# Patient Record
Sex: FEMALE | Race: WHITE | NOT HISPANIC OR LATINO | Employment: FULL TIME | URBAN - METROPOLITAN AREA
[De-identification: names, ages, dates, MRNs, and addresses within clinical notes are randomized per-mention and may not be internally consistent; named-entity substitution may affect disease eponyms.]

---

## 2017-03-23 ENCOUNTER — OFFICE VISIT (OUTPATIENT)
Dept: OBSTETRICS AND GYNECOLOGY | Facility: CLINIC | Age: 32
End: 2017-03-23
Payer: COMMERCIAL

## 2017-03-23 VITALS
DIASTOLIC BLOOD PRESSURE: 80 MMHG | HEIGHT: 66 IN | WEIGHT: 165.81 LBS | SYSTOLIC BLOOD PRESSURE: 112 MMHG | BODY MASS INDEX: 26.65 KG/M2

## 2017-03-23 DIAGNOSIS — Z87.42 HISTORY OF ABNORMAL CERVICAL PAP SMEAR: ICD-10-CM

## 2017-03-23 DIAGNOSIS — R11.0 NAUSEA: ICD-10-CM

## 2017-03-23 DIAGNOSIS — G43.109 MIGRAINE WITH AURA AND WITHOUT STATUS MIGRAINOSUS, NOT INTRACTABLE: ICD-10-CM

## 2017-03-23 DIAGNOSIS — Z30.09 GENERAL COUNSELING AND ADVICE FOR CONTRACEPTIVE MANAGEMENT: Primary | ICD-10-CM

## 2017-03-23 PROCEDURE — 99999 PR PBB SHADOW E&M-NEW PATIENT-LVL III: CPT | Mod: PBBFAC,,, | Performed by: OBSTETRICS & GYNECOLOGY

## 2017-03-23 PROCEDURE — 88142 CYTOPATH C/V THIN LAYER: CPT

## 2017-03-23 PROCEDURE — 99395 PREV VISIT EST AGE 18-39: CPT | Mod: S$GLB,,, | Performed by: OBSTETRICS & GYNECOLOGY

## 2017-03-23 PROCEDURE — 87624 HPV HI-RISK TYP POOLED RSLT: CPT

## 2017-03-23 RX ORDER — CALC/MAG/B COMPLEX/D3/HERB 61
15 TABLET ORAL DAILY
Status: ON HOLD | COMMUNITY
End: 2019-10-23

## 2017-03-23 RX ORDER — LORAZEPAM 0.5 MG/1
0.5 TABLET ORAL EVERY 6 HOURS PRN
Status: ON HOLD | COMMUNITY
End: 2023-03-12 | Stop reason: HOSPADM

## 2017-03-23 RX ORDER — ONDANSETRON 4 MG/1
4 TABLET, ORALLY DISINTEGRATING ORAL EVERY 12 HOURS PRN
Qty: 30 TABLET | Refills: 0 | Status: SHIPPED | OUTPATIENT
Start: 2017-03-23 | End: 2018-12-04 | Stop reason: SDUPTHER

## 2017-03-23 RX ORDER — BUTALBITAL, ACETAMINOPHEN AND CAFFEINE 50; 325; 40 MG/1; MG/1; MG/1
1 TABLET ORAL EVERY 4 HOURS PRN
Qty: 30 TABLET | Refills: 1 | Status: SHIPPED | OUTPATIENT
Start: 2017-03-23 | End: 2017-04-22

## 2017-03-23 RX ORDER — ACETAMINOPHEN AND CODEINE PHOSPHATE 120; 12 MG/5ML; MG/5ML
1 SOLUTION ORAL DAILY
Qty: 30 TABLET | Refills: 11 | Status: SHIPPED | OUTPATIENT
Start: 2017-03-23 | End: 2018-07-11 | Stop reason: ALTCHOICE

## 2017-03-23 NOTE — PROGRESS NOTES
"CC: Well woman exam    Rosy Lei is a 32 y.o. female  presents for well woman exam.  LMP: Patient's last menstrual period was 2017..  The patient reports that in 2016 [facility], this was followed by a colposcopy that demonstrated LIANNA II.  She then followed with a physician (Dr. Lopez), in [place] who has been following her with q 3 months pap as did not want to proceed with a LEEP.  . Interested in birth control options, does report migraine with visual symptoms, would like change in pills as feels current contraception has caused weight gain.  Patient has recently lost her job and will be losing insurance and wants to get follow-up prior to that.  Also reports a family history of breast cancer in her maternal aunt at age 28, has not been offered or considered Gene testing.      Past Medical History:   Diagnosis Date    Abnormal Pap smear of cervix     ADHD (attention deficit hyperactivity disorder)     Insomnia     Peptic ulcer disease      Past Surgical History:   Procedure Laterality Date    BREAST SURGERY      CERVICAL BIOPSY  W/ LOOP ELECTRODE EXCISION       Social History     Social History    Marital status: Single     Spouse name: N/A    Number of children: N/A    Years of education: N/A     Occupational History    Not on file.     Social History Main Topics    Smoking status: Former Smoker     Packs/day: 1.50     Years: 13.00     Types: Cigarettes     Quit date: 2012    Smokeless tobacco: Not on file    Alcohol use No    Drug use: No    Sexual activity: Not Currently     Other Topics Concern    Not on file     Social History Narrative     Family History   Problem Relation Age of Onset    Fibroids Mother     Crohn's disease Sister     Fibroids Sister     Breast cancer Maternal Aunt      OB History      Para Term  AB TAB SAB Ectopic Multiple Living    1    1               /80  Ht 5' 6" (1.676 m)  Wt 75.2 " kg (165 lb 12.6 oz)  LMP 03/18/2017  BMI 26.76 kg/m2      ROS:  GENERAL: Denies weight gain or weight loss. Feeling well overall.   SKIN: Denies rash or lesions.   HEAD: Denies head injury or headache.   NODES: Denies enlarged lymph nodes.   CHEST: Denies chest pain or shortness of breath.   CARDIOVASCULAR: Denies palpitations or left sided chest pain.   ABDOMEN: No abdominal pain, constipation, diarrhea, nausea, vomiting or rectal bleeding.   URINARY: No frequency, dysuria, hematuria, or burning on urination.  REPRODUCTIVE: See HPI.   BREASTS: The patient performs breast self-examination and denies pain, lumps, or nipple discharge.   HEMATOLOGIC: No easy bruisability or excessive bleeding.   MUSCULOSKELETAL: Denies joint pain or swelling.   NEUROLOGIC: Denies syncope or weakness.   PSYCHIATRIC: Denies depression, anxiety or mood swings.    PHYSICAL EXAM:  APPEARANCE: Well nourished, well developed, in no acute distress.  AFFECT: WNL, alert and oriented x 3  SKIN: No acne or hirsutism  NECK: Neck symmetric without masses or thyromegaly  NODES: No inguinal, cervical, axillary, or femoral lymph node enlargement  CHEST: Good respiratory effect  ABDOMEN: Soft.  No tenderness or masses.  No hepatosplenomegaly.  No hernias.  BREASTS: Symmetrical, no skin changes or visible lesions.  No palpable masses, nipple discharge bilaterally.  PELVIC: Normal external genitalia without lesions.  Normal hair distribution.  Adequate perineal body, normal urethral meatus.  Vagina moist and well rugated without lesions or discharge.  Cervix pink, without lesions, discharge or tenderness.  No significant cystocele or rectocele.  Bimanual exam shows uterus to be normal size, regular, mobile and nontender.  Adnexa without masses or tenderness.    EXTREMITIES: No edema.    General counseling and advice for contraceptive management  -     norethindrone (MICRONOR) 0.35 mg tablet; Take 1 tablet (0.35 mg total) by mouth once daily.  Dispense:  30 tablet; Refill: 11    History of abnormal cervical Pap smear- LIANNA II  -     Cancel: Liquid-based pap smear, screening  -     HPV DNA probe, amplified  -     Liquid-based pap smear, screening    Nausea  -     ondansetron (ZOFRAN-ODT) 4 MG TbDL; Take 1 tablet (4 mg total) by mouth every 12 (twelve) hours as needed.  Dispense: 30 tablet; Refill: 0    Migraine with aura and without status migrainosus, not intractable  -     norethindrone (MICRONOR) 0.35 mg tablet; Take 1 tablet (0.35 mg total) by mouth once daily.  Dispense: 30 tablet; Refill: 11  - Instructed to take at same time everyday, also instructed to use back-up contraception.    Other orders  -     butalbital-acetaminophen-caffeine -40 mg (FIORICET, ESGIC) -40 mg per tablet; Take 1 tablet by mouth every 4 (four) hours as needed for Pain.  Dispense: 30 tablet; Refill: 1  - Instructed to use sparingly due to risk of rebound HA with frequent use             Patient was counseled today on A.C.S. Pap guidelines and recommendations for yearly pelvic exams, mammograms and monthly self breast exams; to see her PCP for other health maintenance.     No Follow-up on file.

## 2017-03-23 NOTE — MR AVS SNAPSHOT
[facility] 5th [facility]  [address]  [facility] [address]  Phone: [phone]                  Rosy Lie   3/23/2017 9:30 AM   Office Visit    Description:  Female : 1985   Provider:  Anna Spangler DO   Department:  [facility] 5th Floor           Reason for Visit     Well Woman                To Do List           Goals (5 Years of Data)     None      [facility]     [facility] On Call Nurse Care Line -  Assistance  Registered nurses in the [facility] On Call Center provide clinical advisement, health education, appointment booking, and other advisory services.  Call for this free service at [phone].             Medications           Message regarding Medications     Verify the changes and/or additions to your medication regime listed below are the same as discussed with your clinician today.  If any of these changes or additions are incorrect, please notify your healthcare provider.        STOP taking these medications     cyclobenzaprine (FLEXERIL) 5 MG tablet Take 1 tablet (5 mg total) by mouth 3 (three) times daily as needed for Muscle spasms.    esomeprazole (NEXIUM) 40 MG capsule Take 40 mg by mouth before breakfast.    norgestrel-ethinyl estradiol (LO/OVRAL) 0.3-30 mg-mcg per tablet Take 1 tablet by mouth once daily.    ondansetron (ZOFRAN-ODT) 4 MG TbDL Take 1 tablet (4 mg total) by mouth every 12 (twelve) hours as needed.           Verify that the below list of medications is an accurate representation of the medications you are currently taking.  If none reported, the list may be blank. If incorrect, please contact your healthcare provider. Carry this list with you in case of emergency.           Current Medications     dextroamphetamine-amphetamine (AMPHETAMINE SALT COMBO) 10 mg Tab Take 10 mg by mouth 3 (three) times daily.    lansoprazole (PREVACID) 15 MG capsule Take 15 mg by mouth once daily.    lorazepam (ATIVAN) 0.5 MG tablet Take 0.5 mg by mouth  "every 6 (six) hours as needed for Anxiety.    zolpidem (AMBIEN) 5 MG Tab Take 1 tablet (5 mg total) by mouth nightly as needed.           Clinical Reference Information           Your Vitals Were     BP Height Weight Last Period BMI    112/80 5' 6" (1.676 m) 75.2 kg (165 lb 12.6 oz) 03/18/2017 26.76 kg/m2      Blood Pressure          Most Recent Value    BP  112/80      Allergies as of 3/23/2017     Lortab [Hydrocodone-acetaminophen]      Immunizations Administered on Date of Encounter - 3/23/2017     None      [facility] Sign-Up     Activating your [facility] account is as easy as 1-2-3!     1) Visit [email or web address], select Sign Up Now, enter this activation code and your date of birth, then select Next.  [email or web address]  Expires: 5/7/2017 10:03 AM      2) Create a username and password to use when you visit [facility] in the future and select a security question in case you lose your password and select Next.    3) Enter your e-mail address and click Sign Up!    Additional Information  If you have questions, please e-mail [email or web address] or call [phone] to talk to our [facility] staff. Remember, [facility] is NOT to be used for urgent needs. For medical emergencies, dial 911.         Language Assistance Services     ATTENTION: Language assistance services are available, free of charge. Please call [phone].      ATENCIÓN: Si habla español, tiene a stevens disposición servicios gratuitos de asistencia lingüística. Llame al [phone].     CHÚ Ý: N?u b?n nói Ti?ng Vi?t, có các d?ch v? h? tr? ngôn ng? mi?n phí dành cho b?n. G?i s? [phone].         [facility] complies with applicable Federal civil rights laws and does not discriminate on the basis of race, color, national origin, age, disability, or sex.        "

## 2017-03-29 ENCOUNTER — TELEPHONE (OUTPATIENT)
Dept: OBSTETRICS AND GYNECOLOGY | Facility: CLINIC | Age: 32
End: 2017-03-29

## 2017-03-29 NOTE — TELEPHONE ENCOUNTER
----- Message from Abril Medina sent at 3/29/2017 11:22 AM CDT -----  Contact: Patient  X _1st Request  _  2nd Request  _  3rd Request    Who:KRYS ANGULO [[id number]]    Why:Patient was calling to see if her lab results were completed     What Number to Call Back:Patient can be reached at [phone]    When to Expect a call back: (Before the end of the day)   -- if call after 3:00 call back will be tomorrow.

## 2017-03-30 LAB
HPV16 DNA SPEC QL NAA+PROBE: NEGATIVE
HPV16+18+H RISK 12 DNA CVX-IMP: NEGATIVE
HPV18 DNA SPEC QL NAA+PROBE: NEGATIVE

## 2018-06-21 ENCOUNTER — APPOINTMENT (RX ONLY)
Dept: URBAN - METROPOLITAN AREA CLINIC 98 | Facility: CLINIC | Age: 33
Setting detail: DERMATOLOGY
End: 2018-06-21

## 2018-06-21 DIAGNOSIS — L30.9 DERMATITIS, UNSPECIFIED: ICD-10-CM | Status: INADEQUATELY CONTROLLED

## 2018-06-21 PROBLEM — M12.9 ARTHROPATHY, UNSPECIFIED: Status: ACTIVE | Noted: 2018-06-21

## 2018-06-21 PROBLEM — F41.9 ANXIETY DISORDER, UNSPECIFIED: Status: ACTIVE | Noted: 2018-06-21

## 2018-06-21 PROCEDURE — ? PRESCRIPTION

## 2018-06-21 PROCEDURE — ? TREATMENT REGIMEN

## 2018-06-21 PROCEDURE — ? MEDICATION COUNSELING

## 2018-06-21 PROCEDURE — 99203 OFFICE O/P NEW LOW 30 MIN: CPT

## 2018-06-21 RX ORDER — TERBINAFINE HCL 250 MG
TABLET ORAL
Qty: 14 | Refills: 0 | Status: ERX | COMMUNITY
Start: 2018-06-21

## 2018-06-21 RX ORDER — MINOCYCLINE HYDROCHLORIDE 100 MG/1
CAPSULE ORAL
Qty: 28 | Refills: 0 | Status: ERX | COMMUNITY
Start: 2018-06-21

## 2018-06-21 RX ADMIN — Medication: at 21:29

## 2018-06-21 RX ADMIN — MINOCYCLINE HYDROCHLORIDE: 100 CAPSULE ORAL at 21:28

## 2018-06-21 ASSESSMENT — LOCATION ZONE DERM: LOCATION ZONE: LEG

## 2018-06-21 ASSESSMENT — LOCATION DETAILED DESCRIPTION DERM: LOCATION DETAILED: LEFT KNEE

## 2018-06-21 ASSESSMENT — LOCATION SIMPLE DESCRIPTION DERM: LOCATION SIMPLE: LEFT KNEE

## 2018-06-21 NOTE — PROCEDURE: MEDICATION COUNSELING
Cimetidine Counseling:  I discussed with the patient the risks of Cimetidine including but not limited to gynecomastia, headache, diarrhea, nausea, drowsiness, arrhythmias, pancreatitis, skin rashes, psychosis, bone marrow suppression and kidney toxicity.
Quinolones Counseling:  I discussed with the patient the risks of fluoroquinolones including but not limited to GI upset, allergic reaction, drug rash, diarrhea, dizziness, photosensitivity, yeast infections, liver function test abnormalities, tendonitis/tendon rupture.
Erivedge Pregnancy And Lactation Text: This medication is Pregnancy Category X and is absolutely contraindicated during pregnancy. It is unknown if it is excreted in breast milk.
Bexarotene Counseling:  I discussed with the patient the risks of bexarotene including but not limited to hair loss, dry lips/skin/eyes, liver abnormalities, hyperlipidemia, pancreatitis, depression/suicidal ideation, photosensitivity, drug rash/allergic reactions, hypothyroidism, anemia, leukopenia, infection, cataracts, and teratogenicity.  Patient understands that they will need regular blood tests to check lipid profile, liver function tests, white blood cell count, thyroid function tests and pregnancy test if applicable.
Tazorac Pregnancy And Lactation Text: This medication is not safe during pregnancy. It is unknown if this medication is excreted in breast milk.
Erythromycin Pregnancy And Lactation Text: This medication is Pregnancy Category B and is considered safe during pregnancy. It is also excreted in breast milk.
Hydroquinone Pregnancy And Lactation Text: This medication has not been assigned a Pregnancy Risk Category but animal studies failed to show danger with the topical medication. It is unknown if the medication is excreted in breast milk.
Simponi Pregnancy And Lactation Text: The risk during pregnancy and breastfeeding is uncertain with this medication.
Infliximab Counseling:  I discussed with the patient the risks of infliximab including but not limited to myelosuppression, immunosuppression, autoimmune hepatitis, demyelinating diseases, lymphoma, and serious infections.  The patient understands that monitoring is required including a PPD at baseline and must alert us or the primary physician if symptoms of infection or other concerning signs are noted.
Spironolactone Pregnancy And Lactation Text: This medication can cause feminization of the male fetus and should be avoided during pregnancy. The active metabolite is also found in breast milk.
Birth Control Pills Pregnancy And Lactation Text: This medication should be avoided if pregnant and for the first 30 days post-partum.
Topical Retinoid Pregnancy And Lactation Text: This medication is Pregnancy Category C. It is unknown if this medication is excreted in breast milk.
Carac Counseling:  I discussed with the patient the risks of Carac including but not limited to erythema, scaling, itching, weeping, crusting, and pain.
Tremfya Counseling: I discussed with the patient the risks of guselkumab including but not limited to immunosuppression, serious infections, worsening of inflammatory bowel disease and drug reactions.  The patient understands that monitoring is required including a PPD at baseline and must alert us or the primary physician if symptoms of infection or other concerning signs are noted.
Cellcept Counseling:  I discussed with the patient the risks of mycophenolate mofetil including but not limited to infection/immunosuppression, GI upset, hypokalemia, hypercholesterolemia, bone marrow suppression, lymphoproliferative disorders, malignancy, GI ulceration/bleed/perforation, colitis, interstitial lung disease, kidney failure, progressive multifocal leukoencephalopathy, and birth defects.  The patient understands that monitoring is required including a baseline creatinine and regular CBC testing. In addition, patient must alert us immediately if symptoms of infection or other concerning signs are noted.
Stelara Counseling:  I discussed with the patient the risks of ustekinumab including but not limited to immunosuppression, malignancy, posterior leukoencephalopathy syndrome, and serious infections.  The patient understands that monitoring is required including a PPD at baseline and must alert us or the primary physician if symptoms of infection or other concerning signs are noted.
Ketoconazole Pregnancy And Lactation Text: This medication is Pregnancy Category C and it isn't know if it is safe during pregnancy. It is also excreted in breast milk and breast feeding isn't recommended.
Doxycycline Counseling:  Patient counseled regarding possible photosensitivity and increased risk for sunburn.  Patient instructed to avoid sunlight, if possible.  When exposed to sunlight, patients should wear protective clothing, sunglasses, and sunscreen.  The patient was instructed to call the office immediately if the following severe adverse effects occur:  hearing changes, easy bruising/bleeding, severe headache, or vision changes.  The patient verbalized understanding of the proper use and possible adverse effects of doxycycline.  All of the patient's questions and concerns were addressed.
Infliximab Pregnancy And Lactation Text: This medication is Pregnancy Category B and is considered safe during pregnancy. It is unknown if this medication is excreted in breast milk.
SSKI Counseling:  I discussed with the patient the risks of SSKI including but not limited to thyroid abnormalities, metallic taste, GI upset, fever, headache, acne, arthralgias, paraesthesias, lymphadenopathy, easy bleeding, arrhythmias, and allergic reaction.
Glycopyrrolate Counseling:  I discussed with the patient the risks of glycopyrrolate including but not limited to skin rash, drowsiness, dry mouth, difficulty urinating, and blurred vision.
Rituxan Pregnancy And Lactation Text: This medication is Pregnancy Category C and it isn't know if it is safe during pregnancy. It is unknown if this medication is excreted in breast milk but similar antibodies are known to be excreted.
Fluconazole Counseling:  Patient counseled regarding adverse effects of fluconazole including but not limited to headache, diarrhea, nausea, upset stomach, liver function test abnormalities, taste disturbance, and stomach pain.  There is a rare possibility of liver failure that can occur when taking fluconazole.  The patient understands that monitoring of LFTs and kidney function test may be required, especially at baseline. The patient verbalized understanding of the proper use and possible adverse effects of fluconazole.  All of the patient's questions and concerns were addressed.
Acitretin Counseling:  I discussed with the patient the risks of acitretin including but not limited to hair loss, dry lips/skin/eyes, liver damage, hyperlipidemia, depression/suicidal ideation, photosensitivity.  Serious rare side effects can include but are not limited to pancreatitis, pseudotumor cerebri, bony changes, clot formation/stroke/heart attack.  Patient understands that alcohol is contraindicated since it can result in liver toxicity and significantly prolong the elimination of the drug by many years.
Ivermectin Counseling:  Patient instructed to take medication on an empty stomach with a full glass of water.  Patient informed of potential adverse effects including but not limited to nausea, diarrhea, dizziness, itching, and swelling of the extremities or lymph nodes.  The patient verbalized understanding of the proper use and possible adverse effects of ivermectin.  All of the patient's questions and concerns were addressed.
High Dose Vitamin A Pregnancy And Lactation Text: High dose vitamin A therapy is contraindicated during pregnancy and breast feeding.
Minocycline Counseling: Patient advised regarding possible photosensitivity and discoloration of the teeth, skin, lips, tongue and gums.  Patient instructed to avoid sunlight, if possible.  When exposed to sunlight, patients should wear protective clothing, sunglasses, and sunscreen.  The patient was instructed to call the office immediately if the following severe adverse effects occur:  hearing changes, easy bruising/bleeding, severe headache, or vision changes.  The patient verbalized understanding of the proper use and possible adverse effects of minocycline.  All of the patient's questions and concerns were addressed.
Prednisone Pregnancy And Lactation Text: This medication is Pregnancy Category C and it isn't know if it is safe during pregnancy. This medication is excreted in breast milk.
Benzoyl Peroxide Counseling: Patient counseled that medicine may cause skin irritation and bleach clothing.  In the event of skin irritation, the patient was advised to reduce the amount of the drug applied or use it less frequently.   The patient verbalized understanding of the proper use and possible adverse effects of benzoyl peroxide.  All of the patient's questions and concerns were addressed.
Dupixent Counseling: I discussed with the patient the risks of dupilumab including but not limited to eye infection and irritation, cold sores, injection site reactions, worsening of asthma, allergic reactions and increased risk of parasitic infection.  Live vaccines should be avoided while taking dupilumab. Dupilumab will also interact with certain medications such as warfarin and cyclosporine. The patient understands that monitoring is required and they must alert us or the primary physician if symptoms of infection or other concerning signs are noted.
Griseofulvin Counseling:  I discussed with the patient the risks of griseofulvin including but not limited to photosensitivity, cytopenia, liver damage, nausea/vomiting and severe allergy.  The patient understands that this medication is best absorbed when taken with a fatty meal (e.g., ice cream or french fries).
Valtrex Counseling: I discussed with the patient the risks of valacyclovir including but not limited to kidney damage, nausea, vomiting and severe allergy.  The patient understands that if the infection seems to be worsening or is not improving, they are to call.
Protopic Pregnancy And Lactation Text: This medication is Pregnancy Category C. It is unknown if this medication is excreted in breast milk when applied topically.
Oxybutynin Pregnancy And Lactation Text: This medication is Pregnancy Category B and is considered safe during pregnancy. It is unknown if it is excreted in breast milk.
Cosentyx Counseling:  I discussed with the patient the risks of Cosentyx including but not limited to worsening of Crohn's disease, immunosuppression, allergic reactions and infections.  The patient understands that monitoring is required including a PPD at baseline and must alert us or the primary physician if symptoms of infection or other concerning signs are noted.
Odomzo Counseling- I discussed with the patient the risks of Odomzo including but not limited to nausea, vomiting, diarrhea, constipation, weight loss, changes in the sense of taste, decreased appetite, muscle spasms, and hair loss.  The patient verbalized understanding of the proper use and possible adverse effects of Odomzo.  All of the patient's questions and concerns were addressed.
Gabapentin Counseling: I discussed with the patient the risks of gabapentin including but not limited to dizziness, somnolence, fatigue and ataxia.
Azithromycin Counseling:  I discussed with the patient the risks of azithromycin including but not limited to GI upset, allergic reaction, drug rash, diarrhea, and yeast infections.
Spironolactone Counseling: Patient advised regarding risks of diarrhea, abdominal pain, hyperkalemia, birth defects (for female patients), liver toxicity and renal toxicity. The patient may need blood work to monitor liver and kidney function and potassium levels while on therapy. The patient verbalized understanding of the proper use and possible adverse effects of spironolactone.  All of the patient's questions and concerns were addressed.
Glycopyrrolate Pregnancy And Lactation Text: This medication is Pregnancy Category B and is considered safe during pregnancy. It is unknown if it is excreted breast milk.
Cephalexin Pregnancy And Lactation Text: This medication is Pregnancy Category B and considered safe during pregnancy.  It is also excreted in breast milk but can be used safely for shorter doses.
Elidel Counseling: Patient may experience a mild burning sensation during topical application. Elidel is not approved in children less than 2 years of age. There have been case reports of hematologic and skin malignancies in patients using topical calcineurin inhibitors although causality is questionable.
Tetracycline Pregnancy And Lactation Text: This medication is Pregnancy Category D and not consider safe during pregnancy. It is also excreted in breast milk.
Xolair Pregnancy And Lactation Text: This medication is Pregnancy Category B and is considered safe during pregnancy. This medication is excreted in breast milk.
Prednisone Counseling:  I discussed with the patient the risks of prolonged use of prednisone including but not limited to weight gain, insomnia, osteoporosis, mood changes, diabetes, susceptibility to infection, glaucoma and high blood pressure.  In cases where prednisone use is prolonged, patients should be monitored with blood pressure checks, serum glucose levels and an eye exam.  Additionally, the patient may need to be placed on GI prophylaxis, PCP prophylaxis, and calcium and vitamin D supplementation and/or a bisphosphonate.  The patient verbalized understanding of the proper use and the possible adverse effects of prednisone.  All of the patient's questions and concerns were addressed.
Hydroxyzine Pregnancy And Lactation Text: This medication is not safe during pregnancy and should not be taken. It is also excreted in breast milk and breast feeding isn't recommended.
Terbinafine Pregnancy And Lactation Text: This medication is Pregnancy Category B and is considered safe during pregnancy. It is also excreted in breast milk and breast feeding isn't recommended.
Otezla Pregnancy And Lactation Text: This medication is Pregnancy Category C and it isn't known if it is safe during pregnancy. It is unknown if it is excreted in breast milk.
High Dose Vitamin A Counseling: Side effects reviewed, pt to contact office should one occur.
Azathioprine Pregnancy And Lactation Text: This medication is Pregnancy Category D and isn't considered safe during pregnancy. It is unknown if this medication is excreted in breast milk.
Enbrel Counseling:  I discussed with the patient the risks of etanercept including but not limited to myelosuppression, immunosuppression, autoimmune hepatitis, demyelinating diseases, lymphoma, and infections.  The patient understands that monitoring is required including a PPD at baseline and must alert us or the primary physician if symptoms of infection or other concerning signs are noted.
Nsaids Counseling: NSAID Counseling: I discussed with the patient that NSAIDs should be taken with food. Prolonged use of NSAIDs can result in the development of stomach ulcers.  Patient advised to stop taking NSAIDs if abdominal pain occurs.  The patient verbalized understanding of the proper use and possible adverse effects of NSAIDs.  All of the patient's questions and concerns were addressed.
Methotrexate Counseling:  Patient counseled regarding adverse effects of methotrexate including but not limited to nausea, vomiting, abnormalities in liver function tests. Patients may develop mouth sores, rash, diarrhea, and abnormalities in blood counts. The patient understands that monitoring is required including LFT's and blood counts.  There is a rare possibility of scarring of the liver and lung problems that can occur when taking methotrexate. Persistent nausea, loss of appetite, pale stools, dark urine, cough, and shortness of breath should be reported immediately. Patient advised to discontinue methotrexate treatment at least three months before attempting to become pregnant.  I discussed the need for folate supplements while taking methotrexate.  These supplements can decrease side effects during methotrexate treatment. The patient verbalized understanding of the proper use and possible adverse effects of methotrexate.  All of the patient's questions and concerns were addressed.
Rifampin Pregnancy And Lactation Text: This medication is Pregnancy Category C and it isn't know if it is safe during pregnancy. It is also excreted in breast milk and should not be used if you are breast feeding.
Quinolones Pregnancy And Lactation Text: This medication is Pregnancy Category C and it isn't know if it is safe during pregnancy. It is also excreted in breast milk.
Azithromycin Pregnancy And Lactation Text: This medication is considered safe during pregnancy and is also secreted in breast milk.
Drysol Counseling:  I discussed with the patient the risks of drysol/aluminum chloride including but not limited to skin rash, itching, irritation, burning.
Clofazimine Pregnancy And Lactation Text: This medication is Pregnancy Category C and isn't considered safe during pregnancy. It is excreted in breast milk.
Oxybutynin Counseling:  I discussed with the patient the risks of oxybutynin including but not limited to skin rash, drowsiness, dry mouth, difficulty urinating, and blurred vision.
Metronidazole Counseling:  I discussed with the patient the risks of metronidazole including but not limited to seizures, nausea/vomiting, a metallic taste in the mouth, nausea/vomiting and severe allergy.
5-Fu Counseling: 5-Fluorouracil Counseling:  I discussed with the patient the risks of 5-fluorouracil including but not limited to erythema, scaling, itching, weeping, crusting, and pain.
Xeljanz Counseling: I discussed with the patient the risks of Xeljanz therapy including increased risk of infection, liver issues, headache, diarrhea, or cold symptoms. Live vaccines should be avoided. They were instructed to call if they have any problems.
Griseofulvin Pregnancy And Lactation Text: This medication is Pregnancy Category X and is known to cause serious birth defects. It is unknown if this medication is excreted in breast milk but breast feeding should be avoided.
Isotretinoin Counseling: Patient should get monthly blood tests, not donate blood, not drive at night if vision affected, not share medication, and not undergo elective surgery for 6 months after tx completed. Side effects reviewed, pt to contact office should one occur.
Rituxan Counseling:  I discussed with the patient the risks of Rituxan infusions. Side effects can include infusion reactions, severe drug rashes including mucocutaneous reactions, reactivation of latent hepatitis and other infections and rarely progressive multifocal leukoencephalopathy.  All of the patient's questions and concerns were addressed.
Erivedge Counseling- I discussed with the patient the risks of Erivedge including but not limited to nausea, vomiting, diarrhea, constipation, weight loss, changes in the sense of taste, decreased appetite, muscle spasms, and hair loss.  The patient verbalized understanding of the proper use and possible adverse effects of Erivedge.  All of the patient's questions and concerns were addressed.
Cyclosporine Counseling:  I discussed with the patient the risks of cyclosporine including but not limited to hypertension, gingival hyperplasia,myelosuppression, immunosuppression, liver damage, kidney damage, neurotoxicity, lymphoma, and serious infections. The patient understands that monitoring is required including baseline blood pressure, CBC, CMP, lipid panel and uric acid, and then 1-2 times monthly CMP and blood pressure.
Include Pregnancy/Lactation Warning?: No
Cephalexin Counseling: I counseled the patient regarding use of cephalexin as an antibiotic for prophylactic and/or therapeutic purposes. Cephalexin (commonly prescribed under brand name Keflex) is a cephalosporin antibiotic which is active against numerous classes of bacteria, including most skin bacteria. Side effects may include nausea, diarrhea, gastrointestinal upset, rash, hives, yeast infections, and in rare cases, hepatitis, kidney disease, seizures, fever, confusion, neurologic symptoms, and others. Patients with severe allergies to penicillin medications are cautioned that there is about a 10% incidence of cross-reactivity with cephalosporins. When possible, patients with penicillin allergies should use alternatives to cephalosporins for antibiotic therapy.
Imiquimod Counseling:  I discussed with the patient the risks of imiquimod including but not limited to erythema, scaling, itching, weeping, crusting, and pain.  Patient understands that the inflammatory response to imiquimod is variable from person to person and was educated regarded proper titration schedule.  If flu-like symptoms develop, patient knows to discontinue the medication and contact us.
Solaraze Pregnancy And Lactation Text: This medication is Pregnancy Category B and is considered safe. There is some data to suggest avoiding during the third trimester. It is unknown if this medication is excreted in breast milk.
Humira Counseling:  I discussed with the patient the risks of adalimumab including but not limited to myelosuppression, immunosuppression, autoimmune hepatitis, demyelinating diseases, lymphoma, and serious infections.  The patient understands that monitoring is required including a PPD at baseline and must alert us or the primary physician if symptoms of infection or other concerning signs are noted.
Otezla Counseling: The side effects of Otezla were discussed with the patient, including but not limited to worsening or new depression, weight loss, diarrhea, nausea, upper respiratory tract infection, and headache. Patient instructed to call the office should any adverse effect occur.  The patient verbalized understanding of the proper use and possible adverse effects of Otezla.  All the patient's questions and concerns were addressed.
Hydroxychloroquine Pregnancy And Lactation Text: This medication has been shown to cause fetal harm but it isn't assigned a Pregnancy Risk Category. There are small amounts excreted in breast milk.
Colchicine Counseling:  Patient counseled regarding adverse effects including but not limited to stomach upset (nausea, vomiting, stomach pain, or diarrhea).  Patient instructed to limit alcohol consumption while taking this medication.  Colchicine may reduce blood counts especially with prolonged use.  The patient understands that monitoring of kidney function and blood counts may be required, especially at baseline. The patient verbalized understanding of the proper use and possible adverse effects of colchicine.  All of the patient's questions and concerns were addressed.
Ketoconazole Counseling:   Patient counseled regarding improving absorption with orange juice.  Adverse effects include but are not limited to breast enlargement, headache, diarrhea, nausea, upset stomach, liver function test abnormalities, taste disturbance, and stomach pain.  There is a rare possibility of liver failure that can occur when taking ketoconazole. The patient understands that monitoring of LFTs may be required, especially at baseline. The patient verbalized understanding of the proper use and possible adverse effects of ketoconazole.  All of the patient's questions and concerns were addressed.
Bexarotene Pregnancy And Lactation Text: This medication is Pregnancy Category X and should not be given to women who are pregnant or may become pregnant. This medication should not be used if you are breast feeding.
Azathioprine Counseling:  I discussed with the patient the risks of azathioprine including but not limited to myelosuppression, immunosuppression, hepatotoxicity, lymphoma, and infections.  The patient understands that monitoring is required including baseline LFTs, Creatinine, possible TPMP genotyping and weekly CBCs for the first month and then every 2 weeks thereafter.  The patient verbalized understanding of the proper use and possible adverse effects of azathioprine.  All of the patient's questions and concerns were addressed.
Acitretin Pregnancy And Lactation Text: This medication is Pregnancy Category X and should not be given to women who are pregnant or may become pregnant in the future. This medication is excreted in breast milk.
Doxycycline Pregnancy And Lactation Text: This medication is Pregnancy Category D and not consider safe during pregnancy. It is also excreted in breast milk but is considered safe for shorter treatment courses.
Nsaids Pregnancy And Lactation Text: These medications are considered safe up to 30 weeks gestation. It is excreted in breast milk.
Dapsone Counseling: I discussed with the patient the risks of dapsone including but not limited to hemolytic anemia, agranulocytosis, rashes, methemoglobinemia, kidney failure, peripheral neuropathy, headaches, GI upset, and liver toxicity.  Patients who start dapsone require monitoring including baseline LFTs and weekly CBCs for the first month, then every month thereafter.  The patient verbalized understanding of the proper use and possible adverse effects of dapsone.  All of the patient's questions and concerns were addressed.
Benzoyl Peroxide Pregnancy And Lactation Text: This medication is Pregnancy Category C. It is unknown if benzoyl peroxide is excreted in breast milk.
Erythromycin Counseling:  I discussed with the patient the risks of erythromycin including but not limited to GI upset, allergic reaction, drug rash, diarrhea, increase in liver enzymes, and yeast infections.
Doxepin Counseling:  Patient advised that the medication is sedating and not to drive a car after taking this medication. Patient informed of potential adverse effects including but not limited to dry mouth, urinary retention, and blurry vision.  The patient verbalized understanding of the proper use and possible adverse effects of doxepin.  All of the patient's questions and concerns were addressed.
Carac Pregnancy And Lactation Text: This medication is Pregnancy Category X and contraindicated in pregnancy and in women who may become pregnant. It is unknown if this medication is excreted in breast milk.
Tazorac Counseling:  Patient advised that medication is irritating and drying.  Patient may need to apply sparingly and wash off after an hour before eventually leaving it on overnight.  The patient verbalized understanding of the proper use and possible adverse effects of tazorac.  All of the patient's questions and concerns were addressed.
Bactrim Pregnancy And Lactation Text: This medication is Pregnancy Category D and is known to cause fetal risk.  It is also excreted in breast milk.
Xelzenobiaz Pregnancy And Lactation Text: This medication is Pregnancy Category D and is not considered safe during pregnancy.  The risk during breast feeding is also uncertain.
Clofazimine Counseling:  I discussed with the patient the risks of clofazimine including but not limited to skin and eye pigmentation, liver damage, nausea/vomiting, gastrointestinal bleeding and allergy.
Albendazole Counseling:  I discussed with the patient the risks of albendazole including but not limited to cytopenia, kidney damage, nausea/vomiting and severe allergy.  The patient understands that this medication is being used in an off-label manner.
Eucrisa Counseling: Patient may experience a mild burning sensation during topical application. Eucrisa is not approved in children less than 2 years of age.
Thalidomide Counseling: I discussed with the patient the risks of thalidomide including but not limited to birth defects, anxiety, weakness, chest pain, dizziness, cough and severe allergy.
Hydroquinone Counseling:  Patient advised that medication may result in skin irritation, lightening (hypopigmentation), dryness, and burning.  In the event of skin irritation, the patient was advised to reduce the amount of the drug applied or use it less frequently.  Rarely, spots that are treated with hydroquinone can become darker (pseudoochronosis).  Should this occur, patient instructed to stop medication and call the office. The patient verbalized understanding of the proper use and possible adverse effects of hydroquinone.  All of the patient's questions and concerns were addressed.
Itraconazole Counseling:  I discussed with the patient the risks of itraconazole including but not limited to liver damage, nausea/vomiting, neuropathy, and severe allergy.  The patient understands that this medication is best absorbed when taken with acidic beverages such as non-diet cola or ginger ale.  The patient understands that monitoring is required including baseline LFTs and repeat LFTs at intervals.  The patient understands that they are to contact us or the primary physician if concerning signs are noted.
Topical Sulfur Applications Counseling: Topical Sulfur Counseling: Patient counseled that this medication may cause skin irritation or allergic reactions.  In the event of skin irritation, the patient was advised to reduce the amount of the drug applied or use it less frequently.   The patient verbalized understanding of the proper use and possible adverse effects of topical sulfur application.  All of the patient's questions and concerns were addressed.
Zyclara Counseling:  I discussed with the patient the risks of imiquimod including but not limited to erythema, scaling, itching, weeping, crusting, and pain.  Patient understands that the inflammatory response to imiquimod is variable from person to person and was educated regarded proper titration schedule.  If flu-like symptoms develop, patient knows to discontinue the medication and contact us.
Topical Clindamycin Counseling: Patient counseled that this medication may cause skin irritation or allergic reactions.  In the event of skin irritation, the patient was advised to reduce the amount of the drug applied or use it less frequently.   The patient verbalized understanding of the proper use and possible adverse effects of clindamycin.  All of the patient's questions and concerns were addressed.
Dapsone Pregnancy And Lactation Text: This medication is Pregnancy Category C and is not considered safe during pregnancy or breast feeding.
Taltz Counseling: I discussed with the patient the risks of ixekizumab including but not limited to immunosuppression, serious infections, worsening of inflammatory bowel disease and drug reactions.  The patient understands that monitoring is required including a PPD at baseline and must alert us or the primary physician if symptoms of infection or other concerning signs are noted.
Hydroxyzine Counseling: Patient advised that the medication is sedating and not to drive a car after taking this medication.  Patient informed of potential adverse effects including but not limited to dry mouth, urinary retention, and blurry vision.  The patient verbalized understanding of the proper use and possible adverse effects of hydroxyzine.  All of the patient's questions and concerns were addressed.
Simponi Counseling:  I discussed with the patient the risks of golimumab including but not limited to myelosuppression, immunosuppression, autoimmune hepatitis, demyelinating diseases, lymphoma, and serious infections.  The patient understands that monitoring is required including a PPD at baseline and must alert us or the primary physician if symptoms of infection or other concerning signs are noted.
Valtrex Pregnancy And Lactation Text: this medication is Pregnancy Category B and is considered safe during pregnancy. This medication is not directly found in breast milk but it's metabolite acyclovir is present.
Detail Level: Simple
Ivermectin Pregnancy And Lactation Text: This medication is Pregnancy Category C and it isn't known if it is safe during pregnancy. It is also excreted in breast milk.
Arava Counseling:  Patient counseled regarding adverse effects of Arava including but not limited to nausea, vomiting, abnormalities in liver function tests. Patients may develop mouth sores, rash, diarrhea, and abnormalities in blood counts. The patient understands that monitoring is required including LFTs and blood counts.  There is a rare possibility of scarring of the liver and lung problems that can occur when taking methotrexate. Persistent nausea, loss of appetite, pale stools, dark urine, cough, and shortness of breath should be reported immediately. Patient advised to discontinue Arava treatment and consult with a physician prior to attempting conception. The patient will have to undergo a treatment to eliminate Arava from the body prior to conception.
Topical Sulfur Applications Pregnancy And Lactation Text: This medication is Pregnancy Category C and has an unknown safety profile during pregnancy. It is unknown if this topical medication is excreted in breast milk.
Tetracycline Counseling: Patient counseled regarding possible photosensitivity and increased risk for sunburn.  Patient instructed to avoid sunlight, if possible.  When exposed to sunlight, patients should wear protective clothing, sunglasses, and sunscreen.  The patient was instructed to call the office immediately if the following severe adverse effects occur:  hearing changes, easy bruising/bleeding, severe headache, or vision changes.  The patient verbalized understanding of the proper use and possible adverse effects of tetracycline.  All of the patient's questions and concerns were addressed. Patient understands to avoid pregnancy while on therapy due to potential birth defects.
Dupixent Pregnancy And Lactation Text: This medication likely crosses the placenta but the risk for the fetus is uncertain. This medication is excreted in breast milk.
Minoxidil Counseling: Minoxidil is a topical medication which can increase blood flow where it is applied. It is uncertain how this medication increases hair growth. Side effects are uncommon and include stinging and allergic reactions.
Methotrexate Pregnancy And Lactation Text: This medication is Pregnancy Category X and is known to cause fetal harm. This medication is excreted in breast milk.
Solaraze Counseling:  I discussed with the patient the risks of Solaraze including but not limited to erythema, scaling, itching, weeping, crusting, and pain.
Terbinafine Counseling: Patient counseling regarding adverse effects of terbinafine including but not limited to headache, diarrhea, rash, upset stomach, liver function test abnormalities, itching, taste/smell disturbance, nausea, abdominal pain, and flatulence.  There is a rare possibility of liver failure that can occur when taking terbinafine.  The patient understands that a baseline LFT and kidney function test may be required. The patient verbalized understanding of the proper use and possible adverse effects of terbinafine.  All of the patient's questions and concerns were addressed.
Metronidazole Pregnancy And Lactation Text: This medication is Pregnancy Category B and considered safe during pregnancy.  It is also excreted in breast milk.
Bactrim Counseling:  I discussed with the patient the risks of sulfa antibiotics including but not limited to GI upset, allergic reaction, drug rash, diarrhea, dizziness, photosensitivity, and yeast infections.  Rarely, more serious reactions can occur including but not limited to aplastic anemia, agranulocytosis, methemoglobinemia, blood dyscrasias, liver or kidney failure, lung infiltrates or desquamative/blistering drug rashes.
Birth Control Pills Counseling: Birth Control Pill Counseling: I discussed with the patient the potential side effects of OCPs including but not limited to increased risk of stroke, heart attack, thrombophlebitis, deep venous thrombosis, hepatic adenomas, breast changes, GI upset, headaches, and depression.  The patient verbalized understanding of the proper use and possible adverse effects of OCPs. All of the patient's questions and concerns were addressed.
Picato Counseling:  I discussed with the patient the risks of Picato including but not limited to erythema, scaling, itching, weeping, crusting, and pain.
Isotretinoin Pregnancy And Lactation Text: This medication is Pregnancy Category X and is considered extremely dangerous during pregnancy. It is unknown if it is excreted in breast milk.
Xolair Counseling:  Patient informed of potential adverse effects including but not limited to fever, muscle aches, rash and allergic reactions.  The patient verbalized understanding of the proper use and possible adverse effects of Xolair.  All of the patient's questions and concerns were addressed.
Topical Retinoid counseling:  Patient advised to apply a pea-sized amount only at bedtime and wait 30 minutes after washing their face before applying.  If too drying, patient may add a non-comedogenic moisturizer. The patient verbalized understanding of the proper use and possible adverse effects of retinoids.  All of the patient's questions and concerns were addressed.
Protopic Counseling: Patient may experience a mild burning sensation during topical application. Protopic is not approved in children less than 2 years of age. There have been case reports of hematologic and skin malignancies in patients using topical calcineurin inhibitors although causality is questionable.
Drysol Pregnancy And Lactation Text: This medication is considered safe during pregnancy and breast feeding.
Rifampin Counseling: I discussed with the patient the risks of rifampin including but not limited to liver damage, kidney damage, red-orange body fluids, nausea/vomiting and severe allergy.
Sski Pregnancy And Lactation Text: This medication is Pregnancy Category D and isn't considered safe during pregnancy. It is excreted in breast milk.
Hydroxychloroquine Counseling:  I discussed with the patient that a baseline ophthalmologic exam is needed at the start of therapy and every year thereafter while on therapy. A CBC may also be warranted for monitoring.  The side effects of this medication were discussed with the patient, including but not limited to agranulocytosis, aplastic anemia, seizures, rashes, retinopathy, and liver toxicity. Patient instructed to call the office should any adverse effect occur.  The patient verbalized understanding of the proper use and possible adverse effects of Plaquenil.  All the patient's questions and concerns were addressed.
Doxepin Pregnancy And Lactation Text: This medication is Pregnancy Category C and it isn't known if it is safe during pregnancy. It is also excreted in breast milk and breast feeding isn't recommended.

## 2018-06-21 NOTE — PROCEDURE: TREATMENT REGIMEN
Initiate Treatment: Minocycline 100 mg twice daily x2 weeks with food \\nTerbinafine 250mg once daily for 2 weeks
Plan: Use Hibiclens wash neck down twice weekly
Detail Level: Zone
Discontinue Regimen: Stop all current Rx topicals

## 2018-06-21 NOTE — HPI: SKIN LESION
Is This A New Presentation, Or A Follow-Up?: Skin Lesion
How Severe Is Your Skin Lesion?: moderate
Has Your Skin Lesion Been Treated?: been treated
Additional History: Pt has been applying clotrimazole and beta cream TID for 3 weeks with improvement and 1 week ago started flaring again . Pt has also been on prednisone 20mg taper and applied TAC 0.1% cream twice daily for 2 weeks with no improvement .

## 2018-06-26 ENCOUNTER — RX ONLY (OUTPATIENT)
Age: 33
Setting detail: RX ONLY
End: 2018-06-26

## 2018-06-26 RX ORDER — GRISEOFULVIN 125 MG/5ML
SUSPENSION ORAL
Qty: 300 | Refills: 0 | Status: ERX | COMMUNITY
Start: 2018-06-26

## 2018-06-27 ENCOUNTER — HOSPITAL ENCOUNTER (EMERGENCY)
Facility: HOSPITAL | Age: 33
Discharge: HOME | End: 2018-06-27
Attending: EMERGENCY MEDICINE
Payer: COMMERCIAL

## 2018-06-27 VITALS
DIASTOLIC BLOOD PRESSURE: 72 MMHG | SYSTOLIC BLOOD PRESSURE: 120 MMHG | HEART RATE: 80 BPM | WEIGHT: 165 LBS | RESPIRATION RATE: 18 BRPM | HEIGHT: 66 IN | BODY MASS INDEX: 26.52 KG/M2 | TEMPERATURE: 99 F | OXYGEN SATURATION: 100 %

## 2018-06-27 DIAGNOSIS — R53.1 WEAKNESS: ICD-10-CM

## 2018-06-27 DIAGNOSIS — F45.8 HYPERVENTILATION SYNDROME: Primary | ICD-10-CM

## 2018-06-27 LAB
ALBUMIN SERPL BCP-MCNC: 4.2 G/DL
ALP SERPL-CCNC: 34 U/L
ALT SERPL W/O P-5'-P-CCNC: 17 U/L
ANION GAP SERPL CALC-SCNC: 9 MMOL/L
AST SERPL-CCNC: 16 U/L
B-HCG UR QL: NEGATIVE
B-HCG UR QL: NEGATIVE
BASOPHILS # BLD AUTO: 0.03 K/UL
BASOPHILS NFR BLD: 0.5 %
BILIRUB SERPL-MCNC: 0.5 MG/DL
BILIRUB UR QL STRIP: NEGATIVE
BUN SERPL-MCNC: 8 MG/DL
CALCIUM SERPL-MCNC: 9.3 MG/DL
CHLORIDE SERPL-SCNC: 106 MMOL/L
CLARITY UR REFRACT.AUTO: CLEAR
CO2 SERPL-SCNC: 22 MMOL/L
COLOR UR AUTO: YELLOW
CREAT SERPL-MCNC: 1.1 MG/DL
CTP QC/QA: YES
DIFFERENTIAL METHOD: ABNORMAL
EOSINOPHIL # BLD AUTO: 0 K/UL
EOSINOPHIL NFR BLD: 0.3 %
ERYTHROCYTE [DISTWIDTH] IN BLOOD BY AUTOMATED COUNT: 11.6 %
EST. GFR  (AFRICAN AMERICAN): >60 ML/MIN/1.73 M^2
EST. GFR  (NON AFRICAN AMERICAN): >60 ML/MIN/1.73 M^2
GLUCOSE SERPL-MCNC: 80 MG/DL
GLUCOSE UR QL STRIP: NEGATIVE
HCT VFR BLD AUTO: 34.5 %
HGB BLD-MCNC: 12.2 G/DL
HGB UR QL STRIP: NEGATIVE
IMM GRANULOCYTES # BLD AUTO: 0.01 K/UL
IMM GRANULOCYTES NFR BLD AUTO: 0.2 %
KETONES UR QL STRIP: ABNORMAL
LEUKOCYTE ESTERASE UR QL STRIP: NEGATIVE
LYMPHOCYTES # BLD AUTO: 3 K/UL
LYMPHOCYTES NFR BLD: 47.5 %
MAGNESIUM SERPL-MCNC: 2.2 MG/DL
MCH RBC QN AUTO: 32.4 PG
MCHC RBC AUTO-ENTMCNC: 35.4 G/DL
MCV RBC AUTO: 92 FL
MONOCYTES # BLD AUTO: 0.4 K/UL
MONOCYTES NFR BLD: 6.4 %
NEUTROPHILS # BLD AUTO: 2.8 K/UL
NEUTROPHILS NFR BLD: 45.1 %
NITRITE UR QL STRIP: NEGATIVE
NRBC BLD-RTO: 0 /100 WBC
PH UR STRIP: 6 [PH] (ref 5–8)
PLATELET # BLD AUTO: 202 K/UL
PMV BLD AUTO: 10.6 FL
POTASSIUM SERPL-SCNC: 3.6 MMOL/L
PROT SERPL-MCNC: 7 G/DL
PROT UR QL STRIP: NEGATIVE
RBC # BLD AUTO: 3.76 M/UL
SODIUM SERPL-SCNC: 137 MMOL/L
SP GR UR STRIP: 1.01 (ref 1–1.03)
TROPONIN I SERPL DL<=0.01 NG/ML-MCNC: 0.01 NG/ML
URN SPEC COLLECT METH UR: ABNORMAL
UROBILINOGEN UR STRIP-ACNC: NEGATIVE EU/DL
WBC # BLD AUTO: 6.28 K/UL

## 2018-06-27 PROCEDURE — 85025 COMPLETE CBC W/AUTO DIFF WBC: CPT

## 2018-06-27 PROCEDURE — 63600175 PHARM REV CODE 636 W HCPCS: Performed by: EMERGENCY MEDICINE

## 2018-06-27 PROCEDURE — 81003 URINALYSIS AUTO W/O SCOPE: CPT

## 2018-06-27 PROCEDURE — 81025 URINE PREGNANCY TEST: CPT | Performed by: EMERGENCY MEDICINE

## 2018-06-27 PROCEDURE — 80053 COMPREHEN METABOLIC PANEL: CPT

## 2018-06-27 PROCEDURE — 83735 ASSAY OF MAGNESIUM: CPT

## 2018-06-27 PROCEDURE — 84484 ASSAY OF TROPONIN QUANT: CPT

## 2018-06-27 PROCEDURE — 81025 URINE PREGNANCY TEST: CPT

## 2018-06-27 PROCEDURE — 96374 THER/PROPH/DIAG INJ IV PUSH: CPT

## 2018-06-27 PROCEDURE — 99285 EMERGENCY DEPT VISIT HI MDM: CPT | Mod: ,,, | Performed by: EMERGENCY MEDICINE

## 2018-06-27 PROCEDURE — 93010 ELECTROCARDIOGRAM REPORT: CPT | Mod: ,,, | Performed by: INTERNAL MEDICINE

## 2018-06-27 PROCEDURE — 99284 EMERGENCY DEPT VISIT MOD MDM: CPT | Mod: 25

## 2018-06-27 RX ORDER — ONDANSETRON HYDROCHLORIDE 8 MG/1
8 TABLET, FILM COATED ORAL EVERY 12 HOURS PRN
Qty: 12 TABLET | Refills: 0 | Status: SHIPPED | OUTPATIENT
Start: 2018-06-27 | End: 2019-07-11 | Stop reason: SDUPTHER

## 2018-06-27 RX ORDER — ONDANSETRON 8 MG/1
8 TABLET, ORALLY DISINTEGRATING ORAL
Status: DISCONTINUED | OUTPATIENT
Start: 2018-06-27 | End: 2018-06-27

## 2018-06-27 RX ORDER — LORAZEPAM 2 MG/ML
1 INJECTION INTRAMUSCULAR
Status: COMPLETED | OUTPATIENT
Start: 2018-06-27 | End: 2018-06-27

## 2018-06-27 RX ORDER — ONDANSETRON HYDROCHLORIDE 8 MG/1
8 TABLET, FILM COATED ORAL EVERY 12 HOURS PRN
Qty: 12 TABLET | Refills: 0 | Status: SHIPPED | OUTPATIENT
Start: 2018-06-27 | End: 2018-06-27

## 2018-06-27 RX ADMIN — LORAZEPAM 1 MG: 2 INJECTION INTRAMUSCULAR; INTRAVENOUS at 01:06

## 2018-06-27 NOTE — ED TRIAGE NOTES
"Pt reports to ED with c/o tingling, hyperventialaition, anxiety, dizziness, and not being able to "speak well." Pt reports being at her vet  And getting bad new when she started to have "maybe a panic attack." Pt friend reports pt has current bacterial infection and had a skin reaction to new medication yesterday as well as pt not sleeping in 48hrs. Pt denies chest pain, abd pain, or changes in elimination.    Patient identifiers verified and correct for Rosy Lei.  LOC: The patient is awake, alert and aware of environment with an appropriate affect, the patient is oriented x 3 and speaking appropriately.  APPEARANCE: Patient appears comfortable and in no acute distress, patient is clean and well groomed. Pt appears anxious and is teary.  SKIN: The skin is warm and dry, color consistent with ethnicity, patient has normal skin turgor and moist mucus membranes, skin intact, no breakdown or bruising noted.   MUSCULOSKELETAL: Patient moving all extremities spontaneously, no swelling noted.  RESPIRATORY: Airway is open and patent, respirations are spontaneous, patient has a normal effort and rate, no accessory muscle use noted, pt placed on continuous pulse ox with O2 sats noted at 97% on room air.  CARDIAC: Pt placed on cardiac monitor. Patient has a tachy rate and regular rhythm, no edema noted, capillary refill < 3 seconds.   GASTRO: Soft and non tender to palpation, no distention noted, normoactive bowel sounds present in all four quadrants. Pt states bowel movements have been regular.  : Pt denies any pain or frequency with urination.  NEURO: Pt opens eyes spontaneously, behavior appropriate to situation, follows commands, facial expression symmetrical, bilateral hand grasp equal and even, purposeful motor response noted, normal sensation in all extremities when touched with a finger.        "

## 2018-06-27 NOTE — ED NOTES
Dr. Wagner in triage to evaluate for stroke code.  States pt is not a stroke code but should come to main ED.

## 2018-06-27 NOTE — PROVIDER PROGRESS NOTES - EMERGENCY DEPT.
Encounter Date: 6/27/2018    ED Physician Progress Notes        Physician Note:   Patient is a 33-year-old female with past medical history of ADD and peptic ulcer disease presenting with shortness of breath, difficulty speaking, and tingling of her approximately 1 hr ago while at the bed.  As per friend, patient received bad news while she was at the bed.  Symptoms began shortly after arrival at home.  Friend at bedside said she became emotionally distressed therefore brought her to the emergency room.    Additional history:  Friends states patient had an allergic reaction yesterday and has also not had sleep in 48 hr.  (+) crying (+) emotional distress  Cranial nerves are intact  Sensation intact in upper and lower extremities  5/5 strength in upper and lower extremities  No stroke code activation

## 2018-06-27 NOTE — ED PROVIDER NOTES
"Encounter Date: 6/27/2018    SCRIBE #1 NOTE: I, Usman Julio, am scribing for, and in the presence of, Dr. Toussaint.       History     Chief Complaint   Patient presents with    Tingling     Pt states she is having left lower lip tingling that began approx 1hr ago while she was at the vet with her cat.  Pt states she is having difficulty speaking.     Time patient was seen by the provider: 12:53 PM    The patient is a 33 y.o. female with co-morbidities including: peptic ulcer disease and insomnia who presents to the ED with a complaint of left sided hand tingling, speech difficulty, and severe anxiety that began 1 hour ago. Her speech difficulty is described as being unable to "get the words out".  Pt states she received bad news while at the 's office today and shortly after began experiencing dizziness, mild hallucinations, and SOB.  Associated symptoms include: headaches and insomnia for the past 48 hours. The pt's friend states she has been taking several medications the last couple of weeks due to a bacterial infection, but she also claims she has not taken these medications this morning. Denies seizures, ataxia, nausea, chest pain, additional numbness, bowel incontinence, generalized weakness, and a hx of similar symptoms in the past.   Pt was seen by Dr. Wagner while in triage and was not considered a stroke code.       The history is provided by the patient and medical records.     Review of patient's allergies indicates:   Allergen Reactions    Lortab [hydrocodone-acetaminophen] Rash     Really mean     Past Medical History:   Diagnosis Date    Abnormal Pap smear of cervix     ADHD (attention deficit hyperactivity disorder)     Insomnia     Peptic ulcer disease      Past Surgical History:   Procedure Laterality Date    BREAST SURGERY      CERVICAL BIOPSY  W/ LOOP ELECTRODE EXCISION  2016     Family History   Problem Relation Age of Onset    Fibroids Mother     Crohn's disease Sister     " Fibroids Sister     Breast cancer Maternal Aunt      Social History   Substance Use Topics    Smoking status: Former Smoker     Packs/day: 1.50     Years: 13.00     Types: Cigarettes     Quit date: 9/1/2012    Smokeless tobacco: Not on file    Alcohol use No     Review of Systems   Constitutional: Negative for fever.        Pt is experiencing insomnia.    HENT: Negative for sore throat.    Eyes: Positive for visual disturbance (mild hallucinations ).   Respiratory: Positive for shortness of breath.    Cardiovascular: Negative for chest pain.   Gastrointestinal: Negative for nausea.   Genitourinary: Negative for dysuria.   Musculoskeletal: Negative for back pain.   Skin: Negative for rash.   Neurological: Positive for headaches. Negative for dizziness, seizures, weakness and numbness.        No ataxia and bowel incontinence.    Hematological: Does not bruise/bleed easily.       Physical Exam     Initial Vitals [06/27/18 1226]   BP Pulse Resp Temp SpO2   (!) 174/81 110 20 98.8 °F (37.1 °C) 98 %      MAP       --         Physical Exam    Nursing note and vitals reviewed.  Constitutional: She appears well-developed and well-nourished. She appears distressed (mild to moderate).   Pt seemed to be hyperventilating and extremely anxious.    HENT:   Head: Normocephalic and atraumatic.   Mouth/Throat: Oropharynx is clear and moist.   Eyes: EOM are normal. Pupils are equal, round, and reactive to light.   Neck: Normal range of motion. Neck supple. No thyromegaly present. Carotid bruit is not present.   Cardiovascular: Normal rate, regular rhythm, S1 normal and S2 normal. Exam reveals no gallop, no S3, no S4 and no friction rub.    No murmur heard.  Pulses:       Carotid pulses are 2+ on the right side, and 2+ on the left side.       Dorsalis pedis pulses are 2+ on the right side, and 2+ on the left side.        Posterior tibial pulses are 2+ on the right side, and 2+ on the left side.   Pulmonary/Chest: Breath sounds  normal. No respiratory distress. She has no wheezes. She has no rhonchi. She has no rales.   Abdominal: Soft. Bowel sounds are normal. She exhibits no mass. There is no hepatosplenomegaly. There is no rigidity, no rebound, no guarding, no tenderness at McBurney's point and negative Quigley's sign.   Musculoskeletal: Normal range of motion. She exhibits no edema or tenderness.   Lymphadenopathy:     She has no cervical adenopathy.   Neurological: She is alert and oriented to person, place, and time. She has normal strength. No cranial nerve deficit or sensory deficit.   Gait was not tested. Her words were clear and experienced some difficulty expressing herself, but this seemed more related to anxiety than a neurological deficit.    Skin: Skin is warm, dry and intact. No rash noted. No cyanosis. Nails show no clubbing.   Psychiatric: She has a normal mood and affect.         ED Course   Procedures  Labs Reviewed   CBC W/ AUTO DIFFERENTIAL   COMPREHENSIVE METABOLIC PANEL   MAGNESIUM   TROPONIN I   URINALYSIS   PREGNANCY TEST, URINE RAPID   POCT URINE PREGNANCY     EKG Readings: (Independently Interpreted)   NSR with a rate of 73 bpm. Short VA interval, but otherwise negative.        Imaging Results    None          Medical Decision Making:   History:   Old Medical Records: I decided to obtain old medical records.  Independently Interpreted Test(s):   I have ordered and independently interpreted EKG Reading(s) - see prior notes  Clinical Tests:   Lab Tests: Ordered and Reviewed  Radiological Study: Ordered and Reviewed  Medical Tests: Ordered and Reviewed  ED Management:  The pt is currently normal and back to baseline in no acute distress. All of her tests are negative and normal the including CT of the head and MRI of the brain showing no acute stroke. Pt asked for a prescription of Zofran due to the medications that she is taking are making her nauseous. I will refer her to a local internist.             Scribe  Attestation:   Scribe #1: I performed the above scribed service and the documentation accurately describes the services I performed. I attest to the accuracy of the note.    I, Dr. Ash Toussaint, personally performed the services described in this documentation. All medical record entries made by the scribe were at my direction and in my presence.  I have reviewed the chart and agree that the record reflects my personal performance and is accurate and complete. Ash Toussaint MD.  2:33 PM 06/27/2018           Clinical Impression:   The encounter diagnosis was Weakness.                             Ash Toussaint MD  06/27/18 1750

## 2018-07-09 ENCOUNTER — APPOINTMENT (RX ONLY)
Dept: URBAN - METROPOLITAN AREA CLINIC 98 | Facility: CLINIC | Age: 33
Setting detail: DERMATOLOGY
End: 2018-07-09

## 2018-07-09 DIAGNOSIS — L30.9 DERMATITIS, UNSPECIFIED: ICD-10-CM

## 2018-07-09 PROCEDURE — ? BIOPSY BY PUNCH METHOD

## 2018-07-09 PROCEDURE — 11100: CPT

## 2018-07-09 ASSESSMENT — LOCATION ZONE DERM: LOCATION ZONE: LEG

## 2018-07-09 ASSESSMENT — LOCATION SIMPLE DESCRIPTION DERM: LOCATION SIMPLE: LEFT KNEE

## 2018-07-09 ASSESSMENT — LOCATION DETAILED DESCRIPTION DERM: LOCATION DETAILED: LEFT KNEE

## 2018-07-09 NOTE — PROCEDURE: BIOPSY BY PUNCH METHOD
X Depth Of Punch In Cm (Optional): 0
Was A Bandage Applied: Yes
Patient Will Remove Sutures At Home?: No
Epidermal Sutures: 4-0 Ethilon
Notification Instructions: Patient will be notified of biopsy results. However, patient instructed to call the office if not contacted within 2 weeks.
Hemostasis: None
Lab Facility: [id number]
Biopsy Type: H and E
Post-Care Instructions: I reviewed with the patient in detail post-care instructions. Patient is to keep the biopsy site dry overnight, and then apply bacitracin twice daily until healed. Patient may apply hydrogen peroxide soaks to remove any crusting.
Suture Removal: 14 days
Dressing: pressure dressing with telfa
Anesthesia Volume In Cc (Will Not Render If 0): 1.5
Billing Type: Third-Party Bill
Home Suture Removal Text: Patient was provided a home suture removal kit and will remove their sutures at home.  If they have any questions or difficulties they will call the office.
Wound Care: Petrolatum
Number Of Epidermal Sutures (Optional): 1
Path Notes Override (Will Replace All Of The Above Text): Persistently itchy, previously-annular scaly eruption, now smooth dark red clustered papules localized to left lateral knee.  Refractory to oral terbinafine and minocycline,
Detail Level: Simple
Lab: [id number]
Anesthesia Type: 1% lidocaine without epinephrine
Punch Size In Mm: 3
Consent: Verbal consent was obtained and risks were reviewed including but not limited to scarring, infection, bleeding, scabbing, incomplete removal, nerve damage and allergy to anesthesia.

## 2018-07-11 ENCOUNTER — OFFICE VISIT (OUTPATIENT)
Dept: INTERNAL MEDICINE | Facility: CLINIC | Age: 33
End: 2018-07-11
Payer: COMMERCIAL

## 2018-07-11 VITALS
SYSTOLIC BLOOD PRESSURE: 118 MMHG | TEMPERATURE: 98 F | RESPIRATION RATE: 18 BRPM | HEART RATE: 106 BPM | BODY MASS INDEX: 25.67 KG/M2 | OXYGEN SATURATION: 97 % | DIASTOLIC BLOOD PRESSURE: 84 MMHG | HEIGHT: 67 IN | WEIGHT: 163.56 LBS

## 2018-07-11 DIAGNOSIS — Z00.00 WELL ADULT EXAM: Primary | ICD-10-CM

## 2018-07-11 DIAGNOSIS — M25.50 ARTHRALGIA, UNSPECIFIED JOINT: ICD-10-CM

## 2018-07-11 DIAGNOSIS — G47.00 INSOMNIA, UNSPECIFIED TYPE: ICD-10-CM

## 2018-07-11 DIAGNOSIS — F90.9 ATTENTION DEFICIT HYPERACTIVITY DISORDER (ADHD), UNSPECIFIED ADHD TYPE: ICD-10-CM

## 2018-07-11 DIAGNOSIS — K27.9 PEPTIC ULCER DISEASE: ICD-10-CM

## 2018-07-11 PROCEDURE — 99999 PR PBB SHADOW E&M-EST. PATIENT-LVL IV: CPT | Mod: PBBFAC,,, | Performed by: FAMILY MEDICINE

## 2018-07-11 PROCEDURE — 99385 PREV VISIT NEW AGE 18-39: CPT | Mod: S$GLB,,, | Performed by: FAMILY MEDICINE

## 2018-07-11 RX ORDER — ZOLPIDEM TARTRATE 10 MG/1
10 TABLET ORAL NIGHTLY PRN
Refills: 5 | COMMUNITY
Start: 2018-06-12 | End: 2018-08-30

## 2018-07-11 RX ORDER — NORETHINDRONE ACETATE AND ETHINYL ESTRADIOL 1.5-30(21)
KIT ORAL
Refills: 3 | COMMUNITY
Start: 2018-04-19 | End: 2019-10-21

## 2018-07-11 RX ORDER — DEXTROAMPHETAMINE SACCHARATE, AMPHETAMINE ASPARTATE, DEXTROAMPHETAMINE SULFATE AND AMPHETAMINE SULFATE 3.75; 3.75; 3.75; 3.75 MG/1; MG/1; MG/1; MG/1
15 TABLET ORAL 2 TIMES DAILY
Refills: 0 | Status: ON HOLD | COMMUNITY
Start: 2018-06-15 | End: 2023-03-12 | Stop reason: HOSPADM
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
## 2018-07-13 ENCOUNTER — LAB VISIT (OUTPATIENT)
Dept: LAB | Facility: HOSPITAL | Age: 33
End: 2018-07-13
Attending: FAMILY MEDICINE
Payer: COMMERCIAL

## 2018-07-13 DIAGNOSIS — Z00.00 WELL ADULT EXAM: ICD-10-CM

## 2018-07-13 DIAGNOSIS — M25.50 ARTHRALGIA, UNSPECIFIED JOINT: ICD-10-CM

## 2018-07-13 LAB
25(OH)D3+25(OH)D2 SERPL-MCNC: 28 NG/ML
ALBUMIN SERPL BCP-MCNC: 3.8 G/DL
ALP SERPL-CCNC: 52 U/L
ALT SERPL W/O P-5'-P-CCNC: 24 U/L
ANION GAP SERPL CALC-SCNC: 9 MMOL/L
AST SERPL-CCNC: 26 U/L
BASOPHILS # BLD AUTO: 0.08 K/UL
BASOPHILS NFR BLD: 1.3 %
BILIRUB SERPL-MCNC: 0.3 MG/DL
BUN SERPL-MCNC: 13 MG/DL
CALCIUM SERPL-MCNC: 9.5 MG/DL
CHLORIDE SERPL-SCNC: 107 MMOL/L
CHOLEST SERPL-MCNC: 213 MG/DL
CHOLEST/HDLC SERPL: 3.5 {RATIO}
CO2 SERPL-SCNC: 22 MMOL/L
CREAT SERPL-MCNC: 1 MG/DL
DIFFERENTIAL METHOD: ABNORMAL
EOSINOPHIL # BLD AUTO: 0.1 K/UL
EOSINOPHIL NFR BLD: 2.1 %
ERYTHROCYTE [DISTWIDTH] IN BLOOD BY AUTOMATED COUNT: 11.6 %
EST. GFR  (AFRICAN AMERICAN): >60 ML/MIN/1.73 M^2
EST. GFR  (NON AFRICAN AMERICAN): >60 ML/MIN/1.73 M^2
ESTIMATED AVG GLUCOSE: 103 MG/DL
GLUCOSE SERPL-MCNC: 95 MG/DL
HBA1C MFR BLD HPLC: 5.2 %
HCT VFR BLD AUTO: 39.2 %
HDLC SERPL-MCNC: 61 MG/DL
HDLC SERPL: 28.6 %
HGB BLD-MCNC: 13.1 G/DL
IMM GRANULOCYTES # BLD AUTO: 0.02 K/UL
IMM GRANULOCYTES NFR BLD AUTO: 0.3 %
LDLC SERPL CALC-MCNC: 129.2 MG/DL
LYMPHOCYTES # BLD AUTO: 2.7 K/UL
LYMPHOCYTES NFR BLD: 43.9 %
MCH RBC QN AUTO: 31.8 PG
MCHC RBC AUTO-ENTMCNC: 33.4 G/DL
MCV RBC AUTO: 95 FL
MONOCYTES # BLD AUTO: 0.5 K/UL
MONOCYTES NFR BLD: 7.5 %
NEUTROPHILS # BLD AUTO: 2.8 K/UL
NEUTROPHILS NFR BLD: 44.9 %
NONHDLC SERPL-MCNC: 152 MG/DL
NRBC BLD-RTO: 0 /100 WBC
PLATELET # BLD AUTO: 223 K/UL
PMV BLD AUTO: 11.8 FL
POTASSIUM SERPL-SCNC: 4.3 MMOL/L
PROT SERPL-MCNC: 6.8 G/DL
RBC # BLD AUTO: 4.12 M/UL
SODIUM SERPL-SCNC: 138 MMOL/L
T4 FREE SERPL-MCNC: 0.79 NG/DL
TRIGL SERPL-MCNC: 114 MG/DL
TSH SERPL DL<=0.005 MIU/L-ACNC: 1.39 UIU/ML
WBC # BLD AUTO: 6.24 K/UL

## 2018-07-13 PROCEDURE — 36415 COLL VENOUS BLD VENIPUNCTURE: CPT | Mod: PO

## 2018-07-13 PROCEDURE — 86038 ANTINUCLEAR ANTIBODIES: CPT

## 2018-07-13 PROCEDURE — 84443 ASSAY THYROID STIM HORMONE: CPT

## 2018-07-13 PROCEDURE — 84439 ASSAY OF FREE THYROXINE: CPT

## 2018-07-13 PROCEDURE — 83036 HEMOGLOBIN GLYCOSYLATED A1C: CPT

## 2018-07-13 PROCEDURE — 85025 COMPLETE CBC W/AUTO DIFF WBC: CPT

## 2018-07-13 PROCEDURE — 82306 VITAMIN D 25 HYDROXY: CPT

## 2018-07-13 PROCEDURE — 80053 COMPREHEN METABOLIC PANEL: CPT

## 2018-07-13 PROCEDURE — 80061 LIPID PANEL: CPT

## 2018-07-16 LAB — ANA SER QL IF: NORMAL

## 2018-08-30 ENCOUNTER — PATIENT MESSAGE (OUTPATIENT)
Dept: INTERNAL MEDICINE | Facility: CLINIC | Age: 33
End: 2018-08-30

## 2018-08-30 RX ORDER — ZOLPIDEM TARTRATE 10 MG/1
10 TABLET ORAL NIGHTLY PRN
Qty: 30 TABLET | Refills: 0 | Status: SHIPPED | OUTPATIENT
Start: 2018-08-30 | End: 2019-03-26 | Stop reason: SDUPTHER

## 2018-12-04 DIAGNOSIS — R11.0 NAUSEA: ICD-10-CM

## 2018-12-05 RX ORDER — ONDANSETRON 4 MG/1
4 TABLET, ORALLY DISINTEGRATING ORAL EVERY 12 HOURS PRN
Qty: 30 TABLET | Refills: 0 | Status: SHIPPED | OUTPATIENT
Start: 2018-12-05 | End: 2019-03-26 | Stop reason: SDUPTHER

## 2018-12-11 ENCOUNTER — TELEPHONE (OUTPATIENT)
Dept: OBSTETRICS AND GYNECOLOGY | Facility: CLINIC | Age: 33
End: 2018-12-11

## 2018-12-26 ENCOUNTER — TELEPHONE (OUTPATIENT)
Dept: PULMONOLOGY | Facility: CLINIC | Age: 33
End: 2018-12-26

## 2019-03-26 DIAGNOSIS — R11.0 NAUSEA: ICD-10-CM

## 2019-03-26 RX ORDER — ONDANSETRON 4 MG/1
4 TABLET, ORALLY DISINTEGRATING ORAL EVERY 12 HOURS PRN
Qty: 30 TABLET | Refills: 0 | Status: SHIPPED | OUTPATIENT
Start: 2019-03-26 | End: 2019-07-11 | Stop reason: SDUPTHER

## 2019-03-26 RX ORDER — ZOLPIDEM TARTRATE 10 MG/1
10 TABLET ORAL NIGHTLY PRN
Qty: 30 TABLET | Refills: 0 | Status: SHIPPED | OUTPATIENT
Start: 2019-03-26 | End: 2019-11-19 | Stop reason: SDUPTHER

## 2019-07-11 DIAGNOSIS — R11.0 NAUSEA: ICD-10-CM

## 2019-07-11 DIAGNOSIS — Z00.00 WELL ADULT EXAM: Primary | ICD-10-CM

## 2019-07-11 RX ORDER — ZOLPIDEM TARTRATE 10 MG/1
10 TABLET ORAL NIGHTLY PRN
Qty: 30 TABLET | Refills: 0 | OUTPATIENT
Start: 2019-07-11

## 2019-07-11 RX ORDER — ONDANSETRON HYDROCHLORIDE 8 MG/1
8 TABLET, FILM COATED ORAL EVERY 8 HOURS PRN
Qty: 30 TABLET | Refills: 0 | Status: ON HOLD | OUTPATIENT
Start: 2019-07-11 | End: 2019-10-23 | Stop reason: SDUPTHER

## 2019-07-11 NOTE — TELEPHONE ENCOUNTER
The patient is due for a physical exam and prescriptions have been filled by another prescriber.  No refills will be given until the patient is seen for a physical exam.  Please inform the patient.  Thank you.

## 2019-07-16 RX ORDER — ONDANSETRON 4 MG/1
4 TABLET, ORALLY DISINTEGRATING ORAL EVERY 12 HOURS PRN
Qty: 30 TABLET | Refills: 0 | Status: SHIPPED | OUTPATIENT
Start: 2019-07-16 | End: 2019-11-19

## 2019-10-21 ENCOUNTER — HOSPITAL ENCOUNTER (OUTPATIENT)
Dept: RADIOLOGY | Facility: HOSPITAL | Age: 34
Discharge: HOME | End: 2019-10-21
Attending: PODIATRIST
Payer: COMMERCIAL

## 2019-10-21 ENCOUNTER — OFFICE VISIT (OUTPATIENT)
Dept: PODIATRY | Facility: CLINIC | Age: 34
End: 2019-10-21
Payer: COMMERCIAL

## 2019-10-21 ENCOUNTER — OFFICE VISIT (OUTPATIENT)
Dept: ORTHOPEDICS | Facility: CLINIC | Age: 34
End: 2019-10-21
Payer: COMMERCIAL

## 2019-10-21 VITALS — RESPIRATION RATE: 18 BRPM | BODY MASS INDEX: 25.58 KG/M2 | HEIGHT: 67 IN | WEIGHT: 163 LBS

## 2019-10-21 DIAGNOSIS — R52 PAIN: ICD-10-CM

## 2019-10-21 DIAGNOSIS — L84 CORN OR CALLUS: ICD-10-CM

## 2019-10-21 DIAGNOSIS — R52 PAIN: Primary | ICD-10-CM

## 2019-10-21 DIAGNOSIS — G57.92 NEURITIS OF LEFT HEEL: ICD-10-CM

## 2019-10-21 DIAGNOSIS — S62.617B OPEN DISPLACED FRACTURE OF PROXIMAL PHALANX OF LEFT LITTLE FINGER, INITIAL ENCOUNTER: Primary | ICD-10-CM

## 2019-10-21 DIAGNOSIS — L76.82 INCISIONAL PAIN: ICD-10-CM

## 2019-10-21 PROCEDURE — 73130 X-RAY EXAM OF HAND: CPT | Mod: TC,LT

## 2019-10-21 PROCEDURE — 99203 PR OFFICE/OUTPT VISIT, NEW, LEVL III, 30-44 MIN: ICD-10-PCS | Mod: 25,S$GLB,, | Performed by: PODIATRIST

## 2019-10-21 PROCEDURE — 20550 PR INJECT TENDON SHEATH/LIGAMENT: ICD-10-PCS | Mod: LT,S$GLB,, | Performed by: PODIATRIST

## 2019-10-21 PROCEDURE — 73630 XR FOOT COMPLETE 3 VIEW LEFT: ICD-10-PCS | Mod: 26,LT,, | Performed by: RADIOLOGY

## 2019-10-21 PROCEDURE — 73630 X-RAY EXAM OF FOOT: CPT | Mod: TC,LT

## 2019-10-21 PROCEDURE — 20550 NJX 1 TENDON SHEATH/LIGAMENT: CPT | Mod: LT,S$GLB,, | Performed by: PODIATRIST

## 2019-10-21 PROCEDURE — 73630 X-RAY EXAM OF FOOT: CPT | Mod: 26,LT,, | Performed by: RADIOLOGY

## 2019-10-21 PROCEDURE — 73130 X-RAY EXAM OF HAND: CPT | Mod: 26,LT,, | Performed by: RADIOLOGY

## 2019-10-21 PROCEDURE — 99999 PR PBB SHADOW E&M-EST. PATIENT-LVL III: ICD-10-PCS | Mod: PBBFAC,,, | Performed by: PODIATRIST

## 2019-10-21 PROCEDURE — 73130 XR HAND COMPLETE 3 VIEW LEFT: ICD-10-PCS | Mod: 26,LT,, | Performed by: RADIOLOGY

## 2019-10-21 PROCEDURE — 99203 OFFICE O/P NEW LOW 30 MIN: CPT | Mod: S$GLB,,, | Performed by: PHYSICIAN ASSISTANT

## 2019-10-21 PROCEDURE — 99999 PR PBB SHADOW E&M-EST. PATIENT-LVL III: CPT | Mod: PBBFAC,,, | Performed by: PHYSICIAN ASSISTANT

## 2019-10-21 PROCEDURE — 99203 PR OFFICE/OUTPT VISIT, NEW, LEVL III, 30-44 MIN: ICD-10-PCS | Mod: S$GLB,,, | Performed by: PHYSICIAN ASSISTANT

## 2019-10-21 PROCEDURE — 99999 PR PBB SHADOW E&M-EST. PATIENT-LVL III: CPT | Mod: PBBFAC,,, | Performed by: PODIATRIST

## 2019-10-21 PROCEDURE — 99203 OFFICE O/P NEW LOW 30 MIN: CPT | Mod: 25,S$GLB,, | Performed by: PODIATRIST

## 2019-10-21 PROCEDURE — 99999 PR PBB SHADOW E&M-EST. PATIENT-LVL III: ICD-10-PCS | Mod: PBBFAC,,, | Performed by: PHYSICIAN ASSISTANT

## 2019-10-21 RX ORDER — DEXTROAMPHETAMINE SACCHARATE, AMPHETAMINE ASPARTATE, DEXTROAMPHETAMINE SULFATE AND AMPHETAMINE SULFATE 5; 5; 5; 5 MG/1; MG/1; MG/1; MG/1
20 TABLET ORAL
Status: ON HOLD | COMMUNITY
End: 2019-10-23 | Stop reason: SDUPTHER

## 2019-10-21 RX ORDER — BUPROPION HYDROCHLORIDE 75 MG/1
75 TABLET ORAL
Status: ON HOLD | COMMUNITY
End: 2019-10-23

## 2019-10-21 RX ORDER — HYDROCODONE BITARTRATE AND ACETAMINOPHEN 5; 325 MG/1; MG/1
1 TABLET ORAL EVERY 6 HOURS PRN
Qty: 15 TABLET | Refills: 0 | Status: SHIPPED | OUTPATIENT
Start: 2019-10-21 | End: 2019-11-19

## 2019-10-21 RX ORDER — LEVONORGESTREL AND ETHINYL ESTRADIOL 0.15-0.03
1 KIT ORAL
Status: ON HOLD | COMMUNITY
End: 2023-03-12 | Stop reason: HOSPADM

## 2019-10-21 RX ORDER — TRAMADOL HYDROCHLORIDE 50 MG/1
50 TABLET ORAL
Qty: 20 TABLET | Refills: 0 | Status: SHIPPED | OUTPATIENT
Start: 2019-10-21 | End: 2019-10-21

## 2019-10-22 ENCOUNTER — OFFICE VISIT (OUTPATIENT)
Dept: ORTHOPEDICS | Facility: CLINIC | Age: 34
End: 2019-10-22
Payer: COMMERCIAL

## 2019-10-22 ENCOUNTER — ANESTHESIA EVENT (OUTPATIENT)
Dept: SURGERY | Facility: HOSPITAL | Age: 34
End: 2019-10-22
Payer: COMMERCIAL

## 2019-10-22 ENCOUNTER — PATIENT MESSAGE (OUTPATIENT)
Dept: PODIATRY | Facility: CLINIC | Age: 34
End: 2019-10-22

## 2019-10-22 ENCOUNTER — HOSPITAL ENCOUNTER (OUTPATIENT)
Dept: RADIOLOGY | Facility: HOSPITAL | Age: 34
Discharge: HOME | End: 2019-10-22
Attending: ORTHOPAEDIC SURGERY
Payer: COMMERCIAL

## 2019-10-22 ENCOUNTER — PATIENT MESSAGE (OUTPATIENT)
Dept: INTERNAL MEDICINE | Facility: CLINIC | Age: 34
End: 2019-10-22

## 2019-10-22 DIAGNOSIS — S62.617B OPEN DISPLACED FRACTURE OF PROXIMAL PHALANX OF LEFT LITTLE FINGER, INITIAL ENCOUNTER: ICD-10-CM

## 2019-10-22 DIAGNOSIS — S62.617A CLOSED DISPLACED FRACTURE OF PROXIMAL PHALANX OF LEFT LITTLE FINGER, INITIAL ENCOUNTER: Primary | ICD-10-CM

## 2019-10-22 PROCEDURE — 73140 X-RAY EXAM OF FINGER(S): CPT | Mod: TC

## 2019-10-22 PROCEDURE — 73140 X-RAY EXAM OF FINGER(S): CPT | Mod: 26,LT,, | Performed by: RADIOLOGY

## 2019-10-22 PROCEDURE — 99204 OFFICE O/P NEW MOD 45 MIN: CPT | Mod: S$GLB,,, | Performed by: ORTHOPAEDIC SURGERY

## 2019-10-22 PROCEDURE — 73140 XR FINGER 2 OR MORE VIEWS: ICD-10-PCS | Mod: 26,LT,, | Performed by: RADIOLOGY

## 2019-10-22 PROCEDURE — 99999 PR PBB SHADOW E&M-EST. PATIENT-LVL II: CPT | Mod: PBBFAC,,, | Performed by: ORTHOPAEDIC SURGERY

## 2019-10-22 PROCEDURE — 99204 PR OFFICE/OUTPT VISIT, NEW, LEVL IV, 45-59 MIN: ICD-10-PCS | Mod: S$GLB,,, | Performed by: ORTHOPAEDIC SURGERY

## 2019-10-22 PROCEDURE — 99999 PR PBB SHADOW E&M-EST. PATIENT-LVL II: ICD-10-PCS | Mod: PBBFAC,,, | Performed by: ORTHOPAEDIC SURGERY

## 2019-10-22 RX ORDER — MUPIROCIN 20 MG/G
OINTMENT TOPICAL
Status: CANCELLED | OUTPATIENT
Start: 2019-10-22

## 2019-10-22 RX ORDER — DEXAMETHASONE SODIUM PHOSPHATE 4 MG/ML
2 INJECTION, SOLUTION INTRA-ARTICULAR; INTRALESIONAL; INTRAMUSCULAR; INTRAVENOUS; SOFT TISSUE ONCE
Status: COMPLETED | OUTPATIENT
Start: 2019-10-22 | End: 2019-10-22

## 2019-10-22 RX ORDER — SODIUM CHLORIDE 9 MG/ML
INJECTION, SOLUTION INTRAVENOUS CONTINUOUS
Status: CANCELLED | OUTPATIENT
Start: 2019-10-22

## 2019-10-22 RX ADMIN — DEXAMETHASONE SODIUM PHOSPHATE 2 MG: 4 INJECTION, SOLUTION INTRA-ARTICULAR; INTRALESIONAL; INTRAMUSCULAR; INTRAVENOUS; SOFT TISSUE at 12:10

## 2019-10-22 NOTE — LETTER
October 22, 2019      Sudeep Garcia PA-C  [address]  [facility] [address]           [facility]  [address]  [facility] [address]  Phone: [phone]          Patient: Rosy Lei   MR Number: [id number]   YOB: 1985   Date of Visit: 10/22/2019       Dear Sudeep Garcia:    Thank you for referring Rosy Lei to me for evaluation. Attached you will find relevant portions of my assessment and plan of care.    If you have questions, please do not hesitate to call me. I look forward to following Rosy Lei along with you.    Sincerely,    Luis Angel Lynn MD    Enclosure  CC:  No Recipients    If you would like to receive this communication electronically, please contact [email or web address] or [phone] to request more information on [employer] access.    For providers and/or their staff who would like to refer a patient to [facility], please contact us through our one-stop-shop provider referral line, [facility], at [phone].    If you feel you have received this communication in error or would no longer like to receive these types of communications, please e-mail [email or web address]

## 2019-10-22 NOTE — PROGRESS NOTES
SUBJECTIVE:     Chief Complaint & History of Present Illness:  Roys Lei is a New patient 34 y.o. female who is seen here today with a complaint of left hand pain  .  Patient referred from Podiatry today for evaluation of sudden onset pain in the lateral aspect of the left hand.  Patient states she misstepped and fell forward on outstretched hand developed immediate pain and swelling in the area difficulty with range of motion and  strength. Incident occurred yesterday.  On a scale of 1-10, with 10 being worst pain imaginable, he rates this pain as 4 on good days and 7 on bad days.  she describes the pain as tender.    Review of patient's allergies indicates:   Allergen Reactions    Tramadol Rash         Current Outpatient Medications   Medication Sig Dispense Refill    buPROPion (WELLBUTRIN) 75 MG tablet Take 75 mg by mouth.      dextroamphetamine-amphetamine (ADDERALL) 15 mg tablet Take 15 mg by mouth 2 (two) times daily.  0    dextroamphetamine-amphetamine (ADDERALL) 20 mg tablet Take 20 mg by mouth.      dextroamphetamine-amphetamine (AMPHETAMINE SALT COMBO) 10 mg Tab Take 10 mg by mouth 3 (three) times daily.      lansoprazole (PREVACID) 15 MG capsule Take 15 mg by mouth once daily.      levonorgestrel-ethinyl estradiol (SEASONALE) 0.15 mg-30 mcg (91) per tablet Take 1 tablet by mouth.      lorazepam (ATIVAN) 0.5 MG tablet Take 0.5 mg by mouth every 6 (six) hours as needed for Anxiety.      metroNIDAZOLE (FLAGYL) 500 MG tablet Take 1 tablet (500 mg total) by mouth 3 (three) times daily. 21 tablet 1    ondansetron (ZOFRAN) 8 MG tablet Take 1 tablet (8 mg total) by mouth every 8 (eight) hours as needed for Nausea. 30 tablet 0    ondansetron (ZOFRAN-ODT) 4 MG TbDL Take 1 tablet (4 mg total) by mouth every 12 (twelve) hours as needed. 30 tablet 0    simethicone (GAS-X EXTRA STRENGTH) 125 mg Cap capsule Take 1 capsule (125 mg total) by mouth 3 (three) times daily as needed for Flatulence. 30  capsule 2    zolpidem (AMBIEN) 10 mg Tab Take 1 tablet (10 mg total) by mouth nightly as needed (insomnia). 30 tablet 0    HYDROcodone-acetaminophen (NORCO) 5-325 mg per tablet Take 1 tablet by mouth every 6 (six) hours as needed for Pain. 15 tablet 0     No current facility-administered medications for this visit.        Past Medical History:   Diagnosis Date    Abnormal Pap smear of cervix     ADHD (attention deficit hyperactivity disorder)     Diabetes mellitus, type 2     Insomnia     Peptic ulcer disease        Past Surgical History:   Procedure Laterality Date    BREAST SURGERY      Reduction    CERVICAL BIOPSY  W/ LOOP ELECTRODE EXCISION  2016    WISDOM TOOTH EXTRACTION         Vital Signs (Most Recent)  There were no vitals filed for this visit.        Review of Systems:  ROS:  Constitutional: no fever or chills  Eyes: no visual changes  ENT: no nasal congestion or sore throat  Respiratory: no cough or shortness of breath  Cardiovascular: no chest pain or palpitations  Gastrointestinal: no nausea or vomiting, tolerating diet, Positive peptic ulcer disease  Genitourinary: no hematuria or dysuria  Integument/Breast: no rash or pruritis  Hematologic/Lymphatic: no easy bruising or lymphadenopathy  Musculoskeletal: no arthralgias or myalgias  Neurological: no seizures or tremors  Behavioral/Psych: no auditory or visual hallucinations, Positive ADHD  Endocrine: no heat or cold intolerance, Positive history diabetes type 2                OBJECTIVE:     PHYSICAL EXAM:     , General Appearance: Well nourished, well developed, in no acute distress.  Neurological: Mood & affect are normal.  left hand and little finger   History of injury: fall  Pain: Description: moderate, intermittent and worse if dependent  Night pain: yes and moderate  Rest pain: yes and moderate  Quality: sharp and throbbing  Location: wrist, ulnar, little finger and proximal phalanx  Neurological complaints: none  Mass/Swelling: Size:  stable  Location: dorsal hand, ring finger and proximal phalanx  Condition of skin:intact and bruising  Hand Exam: radial pulse normal and sensation normal  ROM:all fingers flex to palm except 5th digit by 2 cm and thumb flexes to palm    Wrist Exam: palmar flexion 80/80, dorsiflexion 90/90, pronation 80/80, supination 80/80      RADIOGRAPHS:  X-rays taken today films reviewed by me demonstrate fracture of the base of the proximal 5th phalanx with slight angulation.    ASSESSMENT/PLAN:       ICD-10-CM ICD-9-CM   1. Closed displaced fracture of base of fifth metacarpal bone of left hand, initial encounter S62.317A 815.02       Plan: We discussed with the patient at length all the different treatment options available for her ring finger, including anti-inflammatories, acetaminophen, rest, ice, physical therapy to include strengthening, range of motion exercise ultrasound, splinting,  occasional cortisone injections for temporary relief,  or possible surgical interventions.  Patient placed in an a off on the gutter splint with isolation of the 4th and 5th digits in extension. Maintained rest elevation and ice  Short prescription of Norco 5/325 q.6 hours p.r.n. for for breakthrough pain. BMP was consulted for evidence of opioid abuse none was found patient is taking Adderall and Wellbutrin and has been advised of the concerns with excessive sedation with these bandages expressed her understanding..  Will see back in 2 weeks with new x-rays sooner symptoms dictate

## 2019-10-22 NOTE — H&P (VIEW-ONLY)
[facility]  History & Physical  Orthopedics     SUBJECTIVE:       Chief Complaint:  Left small finger injury     Referring Provider: Sudeep Garcia PA*      History of Present Illness:  Patient is a 34 y.o. right hand dominant female who presents today with complaints of injury to the left small finger.  She notes that she was exercising 2 days ago and sustained an injury to the left small finger.  She noted some bruising and presented to 1 of our physician assistants yesterday where she was placed into a splint.  Upon returning home a splint got wet and she return for follow-up today and was referred to me for further care.  The patient currently reports moderate pain about the left small finger with no other complaints today.  She presents for initial evaluation.      The patient is a/an retired.     Onset of symptoms/DOI was 2 days ago.     Symptoms are aggravated by activity and movement.     Symptoms are alleviated by rest and immobilization.     Symptoms consist of pain and ecchymosis.     The patient rates their pain as a 8/10.     Attempted treatment(s) and/or interventions include rest, activity modification and immobilization.     The patient denies any fevers, chills, N/V, D/C and presents for evaluation.             Past Medical History:   Diagnosis Date    Abnormal Pap smear of cervix      ADHD (attention deficit hyperactivity disorder)      Diabetes mellitus, type 2      Insomnia      Peptic ulcer disease              Past Surgical History:   Procedure Laterality Date    BREAST SURGERY         Reduction    CERVICAL BIOPSY  W/ LOOP ELECTRODE EXCISION   2016    WISDOM TOOTH EXTRACTION               Review of patient's allergies indicates:   Allergen Reactions    Tramadol Rash      Social History          Social History Narrative    Not on file            Family History   Problem Relation Age of Onset    Fibroids Mother      Crohn's disease Sister      Fibroids  Sister      Breast cancer Maternal Aunt      Cancer Maternal Aunt           Breast Cancer    Hypertension Father              Current Outpatient Medications:     buPROPion (WELLBUTRIN) 75 MG tablet, Take 75 mg by mouth., Disp: , Rfl:     dextroamphetamine-amphetamine (ADDERALL) 15 mg tablet, Take 15 mg by mouth 2 (two) times daily., Disp: , Rfl: 0    dextroamphetamine-amphetamine (ADDERALL) 20 mg tablet, Take 20 mg by mouth., Disp: , Rfl:     dextroamphetamine-amphetamine (AMPHETAMINE SALT COMBO) 10 mg Tab, Take 10 mg by mouth 3 (three) times daily., Disp: , Rfl:     HYDROcodone-acetaminophen (NORCO) 5-325 mg per tablet, Take 1 tablet by mouth every 6 (six) hours as needed for Pain., Disp: 15 tablet, Rfl: 0    lansoprazole (PREVACID) 15 MG capsule, Take 15 mg by mouth once daily., Disp: , Rfl:     levonorgestrel-ethinyl estradiol (SEASONALE) 0.15 mg-30 mcg (91) per tablet, Take 1 tablet by mouth., Disp: , Rfl:     lorazepam (ATIVAN) 0.5 MG tablet, Take 0.5 mg by mouth every 6 (six) hours as needed for Anxiety., Disp: , Rfl:     metroNIDAZOLE (FLAGYL) 500 MG tablet, Take 1 tablet (500 mg total) by mouth 3 (three) times daily., Disp: 21 tablet, Rfl: 1    ondansetron (ZOFRAN) 8 MG tablet, Take 1 tablet (8 mg total) by mouth every 8 (eight) hours as needed for Nausea., Disp: 30 tablet, Rfl: 0    ondansetron (ZOFRAN-ODT) 4 MG TbDL, Take 1 tablet (4 mg total) by mouth every 12 (twelve) hours as needed., Disp: 30 tablet, Rfl: 0    simethicone (GAS-X EXTRA STRENGTH) 125 mg Cap capsule, Take 1 capsule (125 mg total) by mouth 3 (three) times daily as needed for Flatulence., Disp: 30 capsule, Rfl: 2    zolpidem (AMBIEN) 10 mg Tab, Take 1 tablet (10 mg total) by mouth nightly as needed (insomnia)., Disp: 30 tablet, Rfl: 0  No current facility-administered medications for this visit.         Review of Systems:  Constitutional: no fever or chills  Eyes: no visual changes  ENT: no nasal congestion or sore  throat  Respiratory: no cough or shortness of breath  Cardiovascular: no chest pain  Gastrointestinal: no nausea or vomiting, tolerating diet  Musculoskeletal: pain, soreness and decreased ROM     OBJECTIVE:       Vital Signs (Most Recent):  Vitals   There were no vitals filed for this visit.     There is no height or weight on file to calculate BMI.        Physical Exam:  Constitutional: The patient appears well-developed and well-nourished. No distress.   Head: Normocephalic and atraumatic.   Nose: Nose normal.   Eyes: Conjunctivae and EOM are normal.   Neck: No tracheal deviation present.   Cardiovascular: Normal rate and intact distal pulses.    Pulmonary/Chest: Effort normal. No respiratory distress.   Abdominal: There is no guarding.   Neurological: The patient is alert.   Psychiatric: The patient has a normal mood and affect.      Left Hand/Wrist Examination:     Observation/Inspection:  Swelling                       moderate left small finger                     Deformity                     positive left small finger  Discoloration               ecchymosis left small finger                  Scars                           none                  Atrophy                        none     HAND/WRIST EXAMINATION:  Finkelstein's Test                                not assessed secondary to condition  WHAT Test                                         not assessed secondary to condition  Snuff box tenderness                          Neg  Valentino's Test                                     not assessed secondary to condition  Hook of Hamate Tenderness              Neg  CMC grind                                           not assessed secondary to condition  Circumduction test                              not assessed secondary to condition     Neurovascular Exam:  Digits WWP, brisk CR < 3s throughout  NVI motor/LTS to M/R/U nerves, radial pulse 2+  Tinel's Test - Carpal Tunnel                not assessed secondary to  condition  Tinel's Test - Cubital Tunnel               not assessed secondary to condition  Phalen's Test                                      not assessed secondary to condition  Median Nerve Compression Test       not assessed secondary to condition     ROM not fully assessed secondary to condition although patient unable to make full fist 2/2 extension deformity of left small finger     RRR  CTAB  Abd S/NT/ND +BS     Diagnostic Results:     Xray -  x-rays of the patient's left small finger demonstrates a displaced and angulated fracture of the small finger proximal phalanx with no other acute fractures or dislocations  EMG - none     ASSESSMENT/PLAN:       34 y.o. yo female with left small finger proximal phalanx fracture  Plan:  Treatment options discussed.  We discussed non operative treatment versus surgical treatment.  The patient would like to proceed with surgical intervention which I feel is reasonable.  We will proceed with operative fixation of the left small finger proximal phalanx on October 23, 2019.     The patient has not responded to adequate non operative treatment at this time and/or non operative treatment is not indicated. Thus, the risks, benefits and alternatives to surgery were discussed with the patient in detail.  Specific risks include but are not limited to bleeding, infection, vessel and/or nerve damage, pain, numbness, tingling, compartment syndrome, need for additional surgery, failure to return to pre-injury and/or preoperative functional status, inability to return to work, scar sensitivity, delayed healing, complex regional pain syndrome, weakness, pulley injury, tendon injury, bowstringing, partial and/or incomplete relief of symptoms, persistence of and/or worsening of symptoms, hardware and/or surgical failure, prominent and/or symptomatic hardware possibly necessitating future removal, osteomyelitis, amputation, loss of function, stiffness, rotational malalignment, functional  debility, dysfunction, decreased  strength, need for prolonged postoperative rehabilitation, malunion, nonunion, deep venous thrombosis, pulmonary embolism, arthritis and death.  The patient states an understanding and wishes to proceed with surgery.   All questions were answered.  No guarantees were implied or stated.  Written informed consent was obtained.              Luis Angel Lynn M.D.     · Please be aware that this note has been generated with the assistance of [email or web address] voice-to-text.  Please excuse any spelling or grammatical errors.

## 2019-10-22 NOTE — PROGRESS NOTES
[facility]  History & Physical  Orthopedics    SUBJECTIVE:      Chief Complaint:  Left small finger injury    Referring Provider: Sudeep Garcia PA*     History of Present Illness:  Patient is a 34 y.o. right hand dominant female who presents today with complaints of injury to the left small finger.  She notes that she was exercising 2 days ago and sustained an injury to the left small finger.  She noted some bruising and presented to 1 of our physician assistants yesterday where she was placed into a splint.  Upon returning home a splint got wet and she return for follow-up today and was referred to me for further care.  The patient currently reports moderate pain about the left small finger with no other complaints today.  She presents for initial evaluation.     The patient is a/an retired.    Onset of symptoms/DOI was 2 days ago.    Symptoms are aggravated by activity and movement.    Symptoms are alleviated by rest and immobilization.    Symptoms consist of pain and ecchymosis.    The patient rates their pain as a 8/10.    Attempted treatment(s) and/or interventions include rest, activity modification and immobilization.     The patient denies any fevers, chills, N/V, D/C and presents for evaluation.       Past Medical History:   Diagnosis Date    Abnormal Pap smear of cervix     ADHD (attention deficit hyperactivity disorder)     Diabetes mellitus, type 2     Insomnia     Peptic ulcer disease      Past Surgical History:   Procedure Laterality Date    BREAST SURGERY      Reduction    CERVICAL BIOPSY  W/ LOOP ELECTRODE EXCISION  2016    WISDOM TOOTH EXTRACTION       Review of patient's allergies indicates:   Allergen Reactions    Tramadol Rash     Social History     Social History Narrative    Not on file     Family History   Problem Relation Age of Onset    Fibroids Mother     Crohn's disease Sister     Fibroids Sister     Breast cancer Maternal Aunt     Cancer Maternal  Aunt         Breast Cancer    Hypertension Father          Current Outpatient Medications:     buPROPion (WELLBUTRIN) 75 MG tablet, Take 75 mg by mouth., Disp: , Rfl:     dextroamphetamine-amphetamine (ADDERALL) 15 mg tablet, Take 15 mg by mouth 2 (two) times daily., Disp: , Rfl: 0    dextroamphetamine-amphetamine (ADDERALL) 20 mg tablet, Take 20 mg by mouth., Disp: , Rfl:     dextroamphetamine-amphetamine (AMPHETAMINE SALT COMBO) 10 mg Tab, Take 10 mg by mouth 3 (three) times daily., Disp: , Rfl:     HYDROcodone-acetaminophen (NORCO) 5-325 mg per tablet, Take 1 tablet by mouth every 6 (six) hours as needed for Pain., Disp: 15 tablet, Rfl: 0    lansoprazole (PREVACID) 15 MG capsule, Take 15 mg by mouth once daily., Disp: , Rfl:     levonorgestrel-ethinyl estradiol (SEASONALE) 0.15 mg-30 mcg (91) per tablet, Take 1 tablet by mouth., Disp: , Rfl:     lorazepam (ATIVAN) 0.5 MG tablet, Take 0.5 mg by mouth every 6 (six) hours as needed for Anxiety., Disp: , Rfl:     metroNIDAZOLE (FLAGYL) 500 MG tablet, Take 1 tablet (500 mg total) by mouth 3 (three) times daily., Disp: 21 tablet, Rfl: 1    ondansetron (ZOFRAN) 8 MG tablet, Take 1 tablet (8 mg total) by mouth every 8 (eight) hours as needed for Nausea., Disp: 30 tablet, Rfl: 0    ondansetron (ZOFRAN-ODT) 4 MG TbDL, Take 1 tablet (4 mg total) by mouth every 12 (twelve) hours as needed., Disp: 30 tablet, Rfl: 0    simethicone (GAS-X EXTRA STRENGTH) 125 mg Cap capsule, Take 1 capsule (125 mg total) by mouth 3 (three) times daily as needed for Flatulence., Disp: 30 capsule, Rfl: 2    zolpidem (AMBIEN) 10 mg Tab, Take 1 tablet (10 mg total) by mouth nightly as needed (insomnia)., Disp: 30 tablet, Rfl: 0  No current facility-administered medications for this visit.       Review of Systems:  Constitutional: no fever or chills  Eyes: no visual changes  ENT: no nasal congestion or sore throat  Respiratory: no cough or shortness of breath  Cardiovascular: no chest  pain  Gastrointestinal: no nausea or vomiting, tolerating diet  Musculoskeletal: pain, soreness and decreased ROM    OBJECTIVE:      Vital Signs (Most Recent):  There were no vitals filed for this visit.  There is no height or weight on file to calculate BMI.      Physical Exam:  Constitutional: The patient appears well-developed and well-nourished. No distress.   Head: Normocephalic and atraumatic.   Nose: Nose normal.   Eyes: Conjunctivae and EOM are normal.   Neck: No tracheal deviation present.   Cardiovascular: Normal rate and intact distal pulses.    Pulmonary/Chest: Effort normal. No respiratory distress.   Abdominal: There is no guarding.   Neurological: The patient is alert.   Psychiatric: The patient has a normal mood and affect.     Left Hand/Wrist Examination:    Observation/Inspection:  Swelling  moderate left small finger    Deformity  positive left small finger  Discoloration  ecchymosis left small finger    Scars   none    Atrophy  none    HAND/WRIST EXAMINATION:  Finkelstein's Test   not assessed secondary to condition  WHAT Test    not assessed secondary to condition  Snuff box tenderness   Neg  Valentino's Test    not assessed secondary to condition  Hook of Hamate Tenderness  Neg  CMC grind    not assessed secondary to condition  Circumduction test   not assessed secondary to condition    Neurovascular Exam:  Digits WWP, brisk CR < 3s throughout  NVI motor/LTS to M/R/U nerves, radial pulse 2+  Tinel's Test - Carpal Tunnel  not assessed secondary to condition  Tinel's Test - Cubital Tunnel  not assessed secondary to condition  Phalen's Test    not assessed secondary to condition  Median Nerve Compression Test not assessed secondary to condition    ROM not fully assessed secondary to condition although patient unable to make full fist 2/2 extension deformity of left small finger    RRR  CTAB  Abd S/NT/ND +BS    Diagnostic Results:     Xray -  x-rays of the patient's left small finger demonstrates a  displaced and angulated fracture of the small finger proximal phalanx with no other acute fractures or dislocations  EMG - none    ASSESSMENT/PLAN:      34 y.o. yo female with left small finger proximal phalanx fracture  Plan:  Treatment options discussed.  We discussed non operative treatment versus surgical treatment.  The patient would like to proceed with surgical intervention which I feel is reasonable.  We will proceed with operative fixation of the left small finger proximal phalanx on October 23, 2019.    The patient has not responded to adequate non operative treatment at this time and/or non operative treatment is not indicated. Thus, the risks, benefits and alternatives to surgery were discussed with the patient in detail.  Specific risks include but are not limited to bleeding, infection, vessel and/or nerve damage, pain, numbness, tingling, compartment syndrome, need for additional surgery, failure to return to pre-injury and/or preoperative functional status, inability to return to work, scar sensitivity, delayed healing, complex regional pain syndrome, weakness, pulley injury, tendon injury, bowstringing, partial and/or incomplete relief of symptoms, persistence of and/or worsening of symptoms, hardware and/or surgical failure, prominent and/or symptomatic hardware possibly necessitating future removal, osteomyelitis, amputation, loss of function, stiffness, rotational malalignment, functional debility, dysfunction, decreased  strength, need for prolonged postoperative rehabilitation, malunion, nonunion, deep venous thrombosis, pulmonary embolism, arthritis and death.  The patient states an understanding and wishes to proceed with surgery.   All questions were answered.  No guarantees were implied or stated.  Written informed consent was obtained.            Luis Angel Lynn M.D.     Please be aware that this note has been generated with the assistance of [email or web address] voice-to-text.  Please excuse any  spelling or grammatical errors.

## 2019-10-22 NOTE — H&P
[facility]  History & Physical  Orthopedics     SUBJECTIVE:       Chief Complaint:  Left small finger injury     Referring Provider: Sudeep Garcia PA*      History of Present Illness:  Patient is a 34 y.o. right hand dominant female who presents today with complaints of injury to the left small finger.  She notes that she was exercising 2 days ago and sustained an injury to the left small finger.  She noted some bruising and presented to 1 of our physician assistants yesterday where she was placed into a splint.  Upon returning home a splint got wet and she return for follow-up today and was referred to me for further care.  The patient currently reports moderate pain about the left small finger with no other complaints today.  She presents for initial evaluation.      The patient is a/an retired.     Onset of symptoms/DOI was 2 days ago.     Symptoms are aggravated by activity and movement.     Symptoms are alleviated by rest and immobilization.     Symptoms consist of pain and ecchymosis.     The patient rates their pain as a 8/10.     Attempted treatment(s) and/or interventions include rest, activity modification and immobilization.     The patient denies any fevers, chills, N/V, D/C and presents for evaluation.             Past Medical History:   Diagnosis Date    Abnormal Pap smear of cervix      ADHD (attention deficit hyperactivity disorder)      Diabetes mellitus, type 2      Insomnia      Peptic ulcer disease              Past Surgical History:   Procedure Laterality Date    BREAST SURGERY         Reduction    CERVICAL BIOPSY  W/ LOOP ELECTRODE EXCISION   2016    WISDOM TOOTH EXTRACTION               Review of patient's allergies indicates:   Allergen Reactions    Tramadol Rash      Social History          Social History Narrative    Not on file            Family History   Problem Relation Age of Onset    Fibroids Mother      Crohn's disease Sister      Fibroids  Sister      Breast cancer Maternal Aunt      Cancer Maternal Aunt           Breast Cancer    Hypertension Father              Current Outpatient Medications:     buPROPion (WELLBUTRIN) 75 MG tablet, Take 75 mg by mouth., Disp: , Rfl:     dextroamphetamine-amphetamine (ADDERALL) 15 mg tablet, Take 15 mg by mouth 2 (two) times daily., Disp: , Rfl: 0    dextroamphetamine-amphetamine (ADDERALL) 20 mg tablet, Take 20 mg by mouth., Disp: , Rfl:     dextroamphetamine-amphetamine (AMPHETAMINE SALT COMBO) 10 mg Tab, Take 10 mg by mouth 3 (three) times daily., Disp: , Rfl:     HYDROcodone-acetaminophen (NORCO) 5-325 mg per tablet, Take 1 tablet by mouth every 6 (six) hours as needed for Pain., Disp: 15 tablet, Rfl: 0    lansoprazole (PREVACID) 15 MG capsule, Take 15 mg by mouth once daily., Disp: , Rfl:     levonorgestrel-ethinyl estradiol (SEASONALE) 0.15 mg-30 mcg (91) per tablet, Take 1 tablet by mouth., Disp: , Rfl:     lorazepam (ATIVAN) 0.5 MG tablet, Take 0.5 mg by mouth every 6 (six) hours as needed for Anxiety., Disp: , Rfl:     metroNIDAZOLE (FLAGYL) 500 MG tablet, Take 1 tablet (500 mg total) by mouth 3 (three) times daily., Disp: 21 tablet, Rfl: 1    ondansetron (ZOFRAN) 8 MG tablet, Take 1 tablet (8 mg total) by mouth every 8 (eight) hours as needed for Nausea., Disp: 30 tablet, Rfl: 0    ondansetron (ZOFRAN-ODT) 4 MG TbDL, Take 1 tablet (4 mg total) by mouth every 12 (twelve) hours as needed., Disp: 30 tablet, Rfl: 0    simethicone (GAS-X EXTRA STRENGTH) 125 mg Cap capsule, Take 1 capsule (125 mg total) by mouth 3 (three) times daily as needed for Flatulence., Disp: 30 capsule, Rfl: 2    zolpidem (AMBIEN) 10 mg Tab, Take 1 tablet (10 mg total) by mouth nightly as needed (insomnia)., Disp: 30 tablet, Rfl: 0  No current facility-administered medications for this visit.         Review of Systems:  Constitutional: no fever or chills  Eyes: no visual changes  ENT: no nasal congestion or sore  throat  Respiratory: no cough or shortness of breath  Cardiovascular: no chest pain  Gastrointestinal: no nausea or vomiting, tolerating diet  Musculoskeletal: pain, soreness and decreased ROM     OBJECTIVE:       Vital Signs (Most Recent):  Vitals   There were no vitals filed for this visit.     There is no height or weight on file to calculate BMI.        Physical Exam:  Constitutional: The patient appears well-developed and well-nourished. No distress.   Head: Normocephalic and atraumatic.   Nose: Nose normal.   Eyes: Conjunctivae and EOM are normal.   Neck: No tracheal deviation present.   Cardiovascular: Normal rate and intact distal pulses.    Pulmonary/Chest: Effort normal. No respiratory distress.   Abdominal: There is no guarding.   Neurological: The patient is alert.   Psychiatric: The patient has a normal mood and affect.      Left Hand/Wrist Examination:     Observation/Inspection:  Swelling                       moderate left small finger                     Deformity                     positive left small finger  Discoloration               ecchymosis left small finger                  Scars                           none                  Atrophy                        none     HAND/WRIST EXAMINATION:  Finkelstein's Test                                not assessed secondary to condition  WHAT Test                                         not assessed secondary to condition  Snuff box tenderness                          Neg  Valentino's Test                                     not assessed secondary to condition  Hook of Hamate Tenderness              Neg  CMC grind                                           not assessed secondary to condition  Circumduction test                              not assessed secondary to condition     Neurovascular Exam:  Digits WWP, brisk CR < 3s throughout  NVI motor/LTS to M/R/U nerves, radial pulse 2+  Tinel's Test - Carpal Tunnel                not assessed secondary to  condition  Tinel's Test - Cubital Tunnel               not assessed secondary to condition  Phalen's Test                                      not assessed secondary to condition  Median Nerve Compression Test       not assessed secondary to condition     ROM not fully assessed secondary to condition although patient unable to make full fist 2/2 extension deformity of left small finger     RRR  CTAB  Abd S/NT/ND +BS     Diagnostic Results:     Xray -  x-rays of the patient's left small finger demonstrates a displaced and angulated fracture of the small finger proximal phalanx with no other acute fractures or dislocations  EMG - none     ASSESSMENT/PLAN:       34 y.o. yo female with left small finger proximal phalanx fracture  Plan:  Treatment options discussed.  We discussed non operative treatment versus surgical treatment.  The patient would like to proceed with surgical intervention which I feel is reasonable.  We will proceed with operative fixation of the left small finger proximal phalanx on October 23, 2019.     The patient has not responded to adequate non operative treatment at this time and/or non operative treatment is not indicated. Thus, the risks, benefits and alternatives to surgery were discussed with the patient in detail.  Specific risks include but are not limited to bleeding, infection, vessel and/or nerve damage, pain, numbness, tingling, compartment syndrome, need for additional surgery, failure to return to pre-injury and/or preoperative functional status, inability to return to work, scar sensitivity, delayed healing, complex regional pain syndrome, weakness, pulley injury, tendon injury, bowstringing, partial and/or incomplete relief of symptoms, persistence of and/or worsening of symptoms, hardware and/or surgical failure, prominent and/or symptomatic hardware possibly necessitating future removal, osteomyelitis, amputation, loss of function, stiffness, rotational malalignment, functional  debility, dysfunction, decreased  strength, need for prolonged postoperative rehabilitation, malunion, nonunion, deep venous thrombosis, pulmonary embolism, arthritis and death.  The patient states an understanding and wishes to proceed with surgery.   All questions were answered.  No guarantees were implied or stated.  Written informed consent was obtained.              Luis Angel Lynn M.D.     · Please be aware that this note has been generated with the assistance of [email or web address] voice-to-text.  Please excuse any spelling or grammatical errors.

## 2019-10-22 NOTE — PROGRESS NOTES
Subjective:      Patient ID: Rosy Lei is a 34 y.o. female.    Chief Complaint: PCP (Keenan Maddox ) and Foot Pain (left foot piece of glass )    Rosy is a 34 y.o. female who presents to the podiatry clinic  with complaint of  left foot pain. Onset of the symptoms was several years ago. Precipitating event: dropping a glass mirror which embeded a shard of glas into her heel. she had a  doc attempt to remove it unsuccesfully. she was then taken to [facility] OR where a 3hr FB removal was attempted. she reports chronic  pain to this area x several years . \  Review of Systems   Constitution: Negative for chills, decreased appetite and fever.   Cardiovascular: Negative for leg swelling.   Musculoskeletal: Positive for falls (this AM ) and joint pain (fall w/ finger injury this morning ). Negative for arthritis, joint swelling and myalgias.   Gastrointestinal: Negative for nausea and vomiting.   Neurological: Positive for paresthesias (L heel ). Negative for loss of balance and numbness.               Patient Active Problem List   Diagnosis    Peptic ulcer disease    ADHD (attention deficit hyperactivity disorder)    Insomnia       Current Outpatient Medications on File Prior to Visit   Medication Sig Dispense Refill    buPROPion (WELLBUTRIN) 75 MG tablet Take 75 mg by mouth.      dextroamphetamine-amphetamine (ADDERALL) 15 mg tablet Take 15 mg by mouth 2 (two) times daily.  0    dextroamphetamine-amphetamine (ADDERALL) 20 mg tablet Take 20 mg by mouth.      dextroamphetamine-amphetamine (AMPHETAMINE SALT COMBO) 10 mg Tab Take 10 mg by mouth 3 (three) times daily.      lansoprazole (PREVACID) 15 MG capsule Take 15 mg by mouth once daily.      levonorgestrel-ethinyl estradiol (SEASONALE) 0.15 mg-30 mcg (91) per tablet Take 1 tablet by mouth.      lorazepam (ATIVAN) 0.5 MG tablet Take 0.5 mg by mouth every 6 (six) hours as needed for Anxiety.      metroNIDAZOLE (FLAGYL) 500 MG tablet Take 1 tablet (500 mg  total) by mouth 3 (three) times daily. 21 tablet 1    ondansetron (ZOFRAN) 8 MG tablet Take 1 tablet (8 mg total) by mouth every 8 (eight) hours as needed for Nausea. 30 tablet 0    ondansetron (ZOFRAN-ODT) 4 MG TbDL Take 1 tablet (4 mg total) by mouth every 12 (twelve) hours as needed. 30 tablet 0    simethicone (GAS-X EXTRA STRENGTH) 125 mg Cap capsule Take 1 capsule (125 mg total) by mouth 3 (three) times daily as needed for Flatulence. 30 capsule 2    zolpidem (AMBIEN) 10 mg Tab Take 1 tablet (10 mg total) by mouth nightly as needed (insomnia). 30 tablet 0     No current facility-administered medications on file prior to visit.        Review of patient's allergies indicates:   Allergen Reactions    Tramadol Rash       Past Surgical History:   Procedure Laterality Date    BREAST SURGERY      Reduction    CERVICAL BIOPSY  W/ LOOP ELECTRODE EXCISION  2016    WISDOM TOOTH EXTRACTION         Family History   Problem Relation Age of Onset    Fibroids Mother     Crohn's disease Sister     Fibroids Sister     Breast cancer Maternal Aunt     Cancer Maternal Aunt         Breast Cancer    Hypertension Father        Social History     Socioeconomic History    Marital status: Single     Spouse name: Not on file    Number of children: Not on file    Years of education: Not on file    Highest education level: Not on file   Occupational History    Not on file   Social Needs    Financial resource strain: Not on file    Food insecurity:     Worry: Not on file     Inability: Not on file    Transportation needs:     Medical: Not on file     Non-medical: Not on file   Tobacco Use    Smoking status: Former Smoker     Packs/day: 1.50     Years: 13.00     Pack years: 19.50     Types: Cigarettes, Vaping with nicotine     Last attempt to quit: 2012     Years since quittin.1    Smokeless tobacco: Never Used   Substance and Sexual Activity    Alcohol use: Yes     Alcohol/week: 6.0 standard drinks     Types:  "6 Glasses of wine per week    Drug use: No    Sexual activity: Not Currently   Lifestyle    Physical activity:     Days per week: Not on file     Minutes per session: Not on file    Stress: Not on file   Relationships    Social connections:     Talks on phone: Not on file     Gets together: Not on file     Attends Worship service: Not on file     Active member of club or organization: Not on file     Attends meetings of clubs or organizations: Not on file     Relationship status: Not on file   Other Topics Concern    Not on file   Social History Narrative    Not on file           Objective:       Vitals:    10/21/19 1547   Resp: 18   Weight: 73.9 kg (163 lb)   Height: 5' 7" (1.702 m)   PainSc:   6   PainLoc: Foot        Physical Exam   Constitutional: She is oriented to person, place, and time. She appears well-developed and well-nourished.   Cardiovascular:   Pulses:       Dorsalis pedis pulses are 2+ on the right side, and 2+ on the left side.        Posterior tibial pulses are 2+ on the right side, and 2+ on the left side.   Musculoskeletal: She exhibits tenderness (L heel ). She exhibits no edema.        Right ankle: Normal.        Left ankle: Normal.        Right foot: There is no swelling, no crepitus and no deformity.        Left foot: There is no swelling, no crepitus and no deformity.   Adequate joint range of motion without pain, limitation, nor crepitation Bilateral feet and ankle joints. Muscle strength is 5/5 in all groups bilaterally.      incision L medial posterior heel w/ associated hyperkeratosis . Mild TTP to incision line. No fluctuance. + numbness w/ tuning fork to the  Medial calcaneal nerve    Lymphadenopathy:   No palpable lymph nodes   Neurological: She is alert and oriented to person, place, and time. She has normal strength.   Skin: Skin is warm, dry and intact. No rash noted. No erythema. Nails show no clubbing.   Psychiatric: She has a normal mood and affect. Her behavior is " normal.             Assessment:       Encounter Diagnoses   Name Primary?    Pain Yes    Neuritis of left heel     Corn or callus     Incisional pain          Plan:       Rosy was seen today for pcp and foot pain.    Diagnoses and all orders for this visit:    Pain  -     X-Ray Hand 3 View Left; Future  -     X-Ray Foot Complete Left; Future    Neuritis of left heel    Corn or callus    Incisional pain      I counseled the patient on her conditions, their implications and medical management.    Pain likely multifactorial in nature     Diffuse hyperkeratosis to incision line w/ likely nerve entrapment vs neuritis     Patient instructed on adequate icing techniques. Patient should ice the affected area at least once per day x 10 minutes for 10 days . I advised the  patient that extra icing would also be beneficial to ensure adequate anti inflammatory effect     Discussed possible side effects from injection including but NOT limited to discoloration of skin, erosion of soft tissue, and increased likelihood of rupture of a soft tissue structure (ie. Plantar fascia, muscle, tendon, ligament, or capsule in the area of injection). Patient indicates understanding of my explanation, and patient gives verbal consent for injection of affected area. A time out was performed to verify the  correct  patient and site, prior to initiation of procedure.     After sterilizing the area with an alcohol prep, the affected area of the L heel  was injected as per MAR  The patient tolerated the injection well and reported comfort to the area   Radiographs w/ minute sliver of glass. No indication for attempted removal.    I believe the majority of her pain is 2/2 hyperkeratosis w/ neuritis vs nerve entrapment     rtc 3w

## 2019-10-23 ENCOUNTER — ANESTHESIA (OUTPATIENT)
Dept: SURGERY | Facility: HOSPITAL | Age: 34
End: 2019-10-23
Payer: COMMERCIAL

## 2019-10-23 ENCOUNTER — HOSPITAL ENCOUNTER (OUTPATIENT)
Facility: HOSPITAL | Age: 34
Discharge: HOME | End: 2019-10-23
Attending: ORTHOPAEDIC SURGERY | Admitting: ORTHOPAEDIC SURGERY
Payer: COMMERCIAL

## 2019-10-23 DIAGNOSIS — S62.617B OPEN DISPLACED FRACTURE OF PROXIMAL PHALANX OF LEFT LITTLE FINGER, INITIAL ENCOUNTER: ICD-10-CM

## 2019-10-23 DIAGNOSIS — S62.617A CLOSED DISPLACED FRACTURE OF PROXIMAL PHALANX OF LEFT LITTLE FINGER, INITIAL ENCOUNTER: Primary | ICD-10-CM

## 2019-10-23 LAB
B-HCG UR QL: NEGATIVE
CTP QC/QA: YES

## 2019-10-23 PROCEDURE — 37000008 HC ANESTHESIA 1ST 15 MINUTES: Performed by: ORTHOPAEDIC SURGERY

## 2019-10-23 PROCEDURE — 36000706: Performed by: ORTHOPAEDIC SURGERY

## 2019-10-23 PROCEDURE — 26727 PR PERCUT RX PROX/MID FING SHFT FX: ICD-10-PCS | Mod: F4,,, | Performed by: ORTHOPAEDIC SURGERY

## 2019-10-23 PROCEDURE — 25000003 PHARM REV CODE 250: Performed by: ANESTHESIOLOGY

## 2019-10-23 PROCEDURE — D9220A PRA ANESTHESIA: ICD-10-PCS | Mod: ,,, | Performed by: ANESTHESIOLOGY

## 2019-10-23 PROCEDURE — 64415 PR NERVE BLOCK INJ, ANES/STEROID, BRACHIAL PLEXUS, INCL IMAG GUIDANCE: ICD-10-PCS | Mod: 59,LT,, | Performed by: ANESTHESIOLOGY

## 2019-10-23 PROCEDURE — 25000003 PHARM REV CODE 250: Performed by: ORTHOPAEDIC SURGERY

## 2019-10-23 PROCEDURE — 63600175 PHARM REV CODE 636 W HCPCS: Performed by: ORTHOPAEDIC SURGERY

## 2019-10-23 PROCEDURE — 64415 NJX AA&/STRD BRCH PLXS IMG: CPT | Performed by: ANESTHESIOLOGY

## 2019-10-23 PROCEDURE — 76942 ECHO GUIDE FOR BIOPSY: CPT | Performed by: ANESTHESIOLOGY

## 2019-10-23 PROCEDURE — 94770 HC EXHALED C02 TEST: CPT

## 2019-10-23 PROCEDURE — 63600175 PHARM REV CODE 636 W HCPCS: Performed by: NURSE PRACTITIONER

## 2019-10-23 PROCEDURE — 37000009 HC ANESTHESIA EA ADD 15 MINS: Performed by: ORTHOPAEDIC SURGERY

## 2019-10-23 PROCEDURE — 63600175 PHARM REV CODE 636 W HCPCS: Performed by: NURSE ANESTHETIST, CERTIFIED REGISTERED

## 2019-10-23 PROCEDURE — 27201423 OPTIME MED/SURG SUP & DEVICES STERILE SUPPLY: Performed by: ORTHOPAEDIC SURGERY

## 2019-10-23 PROCEDURE — 99900035 HC TECH TIME PER 15 MIN (STAT)

## 2019-10-23 PROCEDURE — 25000003 PHARM REV CODE 250: Performed by: NURSE ANESTHETIST, CERTIFIED REGISTERED

## 2019-10-23 PROCEDURE — 76942 ECHO GUIDE FOR BIOPSY: CPT | Mod: 26,,, | Performed by: ANESTHESIOLOGY

## 2019-10-23 PROCEDURE — 64415 NJX AA&/STRD BRCH PLXS IMG: CPT | Mod: 59,LT,, | Performed by: ANESTHESIOLOGY

## 2019-10-23 PROCEDURE — D9220A PRA ANESTHESIA: Mod: ,,, | Performed by: ANESTHESIOLOGY

## 2019-10-23 PROCEDURE — 76942 PR U/S GUIDANCE FOR NEEDLE GUIDANCE: ICD-10-PCS | Mod: 26,,, | Performed by: ANESTHESIOLOGY

## 2019-10-23 PROCEDURE — 94761 N-INVAS EAR/PLS OXIMETRY MLT: CPT

## 2019-10-23 PROCEDURE — S0020 INJECTION, BUPIVICAINE HYDRO: HCPCS | Performed by: ANESTHESIOLOGY

## 2019-10-23 PROCEDURE — 36000707: Performed by: ORTHOPAEDIC SURGERY

## 2019-10-23 PROCEDURE — 71000015 HC POSTOP RECOV 1ST HR: Performed by: ORTHOPAEDIC SURGERY

## 2019-10-23 PROCEDURE — 26727 TREAT FINGER FRACTURE EACH: CPT | Mod: F4,,, | Performed by: ORTHOPAEDIC SURGERY

## 2019-10-23 PROCEDURE — 71000033 HC RECOVERY, INTIAL HOUR: Performed by: ORTHOPAEDIC SURGERY

## 2019-10-23 PROCEDURE — 82962 GLUCOSE BLOOD TEST: CPT | Performed by: ORTHOPAEDIC SURGERY

## 2019-10-23 PROCEDURE — 25000003 PHARM REV CODE 250

## 2019-10-23 RX ORDER — CEFAZOLIN SODIUM 1 G/3ML
2 INJECTION, POWDER, FOR SOLUTION INTRAMUSCULAR; INTRAVENOUS
Status: DISCONTINUED | OUTPATIENT
Start: 2019-10-23 | End: 2019-10-23 | Stop reason: HOSPADM

## 2019-10-23 RX ORDER — PROPOFOL 10 MG/ML
VIAL (ML) INTRAVENOUS
Status: DISCONTINUED | OUTPATIENT
Start: 2019-10-23 | End: 2019-10-23

## 2019-10-23 RX ORDER — MIDAZOLAM HYDROCHLORIDE 1 MG/ML
0.5 INJECTION INTRAMUSCULAR; INTRAVENOUS
Status: DISCONTINUED | OUTPATIENT
Start: 2019-10-23 | End: 2019-10-23 | Stop reason: HOSPADM

## 2019-10-23 RX ORDER — SODIUM CHLORIDE 0.9 % (FLUSH) 0.9 %
10 SYRINGE (ML) INJECTION
Status: DISCONTINUED | OUTPATIENT
Start: 2019-10-23 | End: 2019-10-23 | Stop reason: HOSPADM

## 2019-10-23 RX ORDER — ONDANSETRON 2 MG/ML
INJECTION INTRAMUSCULAR; INTRAVENOUS
Status: DISCONTINUED | OUTPATIENT
Start: 2019-10-23 | End: 2019-10-23

## 2019-10-23 RX ORDER — DEXAMETHASONE SODIUM PHOSPHATE 4 MG/ML
INJECTION, SOLUTION INTRA-ARTICULAR; INTRALESIONAL; INTRAMUSCULAR; INTRAVENOUS; SOFT TISSUE
Status: DISCONTINUED | OUTPATIENT
Start: 2019-10-23 | End: 2019-10-23

## 2019-10-23 RX ORDER — DEXMEDETOMIDINE HYDROCHLORIDE 100 UG/ML
INJECTION, SOLUTION INTRAVENOUS
Status: DISCONTINUED | OUTPATIENT
Start: 2019-10-23 | End: 2019-10-23

## 2019-10-23 RX ORDER — KETAMINE HCL IN 0.9 % NACL 50 MG/5 ML
SYRINGE (ML) INTRAVENOUS
Status: DISCONTINUED
Start: 2019-10-23 | End: 2019-10-23 | Stop reason: HOSPADM

## 2019-10-23 RX ORDER — MUPIROCIN 20 MG/G
OINTMENT TOPICAL
Status: DISCONTINUED | OUTPATIENT
Start: 2019-10-23 | End: 2019-10-23 | Stop reason: HOSPADM

## 2019-10-23 RX ORDER — MIDAZOLAM HYDROCHLORIDE 1 MG/ML
INJECTION, SOLUTION INTRAMUSCULAR; INTRAVENOUS
Status: DISCONTINUED | OUTPATIENT
Start: 2019-10-23 | End: 2019-10-23

## 2019-10-23 RX ORDER — LIDOCAINE HCL/PF 100 MG/5ML
SYRINGE (ML) INTRAVENOUS
Status: DISCONTINUED | OUTPATIENT
Start: 2019-10-23 | End: 2019-10-23

## 2019-10-23 RX ORDER — KETAMINE HCL IN 0.9 % NACL 50 MG/5 ML
SYRINGE (ML) INTRAVENOUS
Status: DISCONTINUED | OUTPATIENT
Start: 2019-10-23 | End: 2019-10-23

## 2019-10-23 RX ORDER — MUPIROCIN 20 MG/G
1 OINTMENT TOPICAL 2 TIMES DAILY
Status: DISCONTINUED | OUTPATIENT
Start: 2019-10-23 | End: 2019-10-23 | Stop reason: HOSPADM

## 2019-10-23 RX ORDER — HYDROCODONE BITARTRATE AND ACETAMINOPHEN 5; 325 MG/1; MG/1
1 TABLET ORAL EVERY 4 HOURS PRN
Status: DISCONTINUED | OUTPATIENT
Start: 2019-10-23 | End: 2019-10-23 | Stop reason: HOSPADM

## 2019-10-23 RX ORDER — ONDANSETRON 4 MG/1
8 TABLET, ORALLY DISINTEGRATING ORAL EVERY 8 HOURS PRN
Status: DISCONTINUED | OUTPATIENT
Start: 2019-10-23 | End: 2019-10-23 | Stop reason: HOSPADM

## 2019-10-23 RX ORDER — BUPIVACAINE HYDROCHLORIDE 5 MG/ML
INJECTION, SOLUTION PERINEURAL
Status: COMPLETED | OUTPATIENT
Start: 2019-10-23 | End: 2019-10-23

## 2019-10-23 RX ORDER — SODIUM CHLORIDE 9 MG/ML
INJECTION, SOLUTION INTRAVENOUS CONTINUOUS
Status: DISCONTINUED | OUTPATIENT
Start: 2019-10-23 | End: 2019-10-23 | Stop reason: HOSPADM

## 2019-10-23 RX ORDER — PROPOFOL 10 MG/ML
VIAL (ML) INTRAVENOUS CONTINUOUS PRN
Status: DISCONTINUED | OUTPATIENT
Start: 2019-10-23 | End: 2019-10-23

## 2019-10-23 RX ORDER — FENTANYL CITRATE 50 UG/ML
25 INJECTION, SOLUTION INTRAMUSCULAR; INTRAVENOUS EVERY 5 MIN PRN
Status: DISCONTINUED | OUTPATIENT
Start: 2019-10-23 | End: 2019-10-23 | Stop reason: HOSPADM

## 2019-10-23 RX ORDER — CELECOXIB 200 MG/1
400 CAPSULE ORAL ONCE
Status: COMPLETED | OUTPATIENT
Start: 2019-10-23 | End: 2019-10-23

## 2019-10-23 RX ORDER — HYDROCODONE BITARTRATE AND ACETAMINOPHEN 5; 325 MG/1; MG/1
TABLET ORAL
Status: COMPLETED
Start: 2019-10-23 | End: 2019-10-23

## 2019-10-23 RX ORDER — ACETAMINOPHEN 500 MG
1000 TABLET ORAL
Status: COMPLETED | OUTPATIENT
Start: 2019-10-23 | End: 2019-10-23

## 2019-10-23 RX ADMIN — MUPIROCIN: 20 OINTMENT TOPICAL at 09:10

## 2019-10-23 RX ADMIN — CELECOXIB 400 MG: 200 CAPSULE ORAL at 09:10

## 2019-10-23 RX ADMIN — PROPOFOL 100 MCG/KG/MIN: 10 INJECTION, EMULSION INTRAVENOUS at 01:10

## 2019-10-23 RX ADMIN — PROPOFOL 50 MG: 10 INJECTION, EMULSION INTRAVENOUS at 01:10

## 2019-10-23 RX ADMIN — SODIUM CHLORIDE 1000 ML: 0.9 INJECTION, SOLUTION INTRAVENOUS at 09:10

## 2019-10-23 RX ADMIN — MIDAZOLAM 2 MG: 1 INJECTION INTRAMUSCULAR; INTRAVENOUS at 01:10

## 2019-10-23 RX ADMIN — DEXMEDETOMIDINE HYDROCHLORIDE 20 MCG: 100 INJECTION, SOLUTION, CONCENTRATE INTRAVENOUS at 01:10

## 2019-10-23 RX ADMIN — HYDROCODONE BITARTRATE AND ACETAMINOPHEN 1 TABLET: 5; 325 TABLET ORAL at 02:10

## 2019-10-23 RX ADMIN — LIDOCAINE HYDROCHLORIDE 100 MG: 20 INJECTION, SOLUTION INTRAVENOUS at 01:10

## 2019-10-23 RX ADMIN — BUPIVACAINE HYDROCHLORIDE 20 ML: 5 INJECTION, SOLUTION PERINEURAL at 11:10

## 2019-10-23 RX ADMIN — ACETAMINOPHEN 1000 MG: 500 TABLET ORAL at 09:10

## 2019-10-23 RX ADMIN — ONDANSETRON 4 MG: 2 INJECTION INTRAMUSCULAR; INTRAVENOUS at 01:10

## 2019-10-23 RX ADMIN — DEXAMETHASONE SODIUM PHOSPHATE 8 MG: 4 INJECTION, SOLUTION INTRAMUSCULAR; INTRAVENOUS at 01:10

## 2019-10-23 RX ADMIN — FENTANYL CITRATE 100 MCG: 50 INJECTION INTRAMUSCULAR; INTRAVENOUS at 11:10

## 2019-10-23 RX ADMIN — CEFAZOLIN 2 G: 330 INJECTION, POWDER, FOR SOLUTION INTRAMUSCULAR; INTRAVENOUS at 01:10

## 2019-10-23 RX ADMIN — SODIUM CHLORIDE: 0.9 INJECTION, SOLUTION INTRAVENOUS at 01:10

## 2019-10-23 RX ADMIN — Medication 20 MG: at 01:10

## 2019-10-23 RX ADMIN — MIDAZOLAM HYDROCHLORIDE 2 MG: 1 INJECTION, SOLUTION INTRAMUSCULAR; INTRAVENOUS at 11:10

## 2019-10-23 NOTE — BRIEF OP NOTE
[facility]  Brief Operative Note     SUMMARY     Surgery Date: 10/23/2019     Surgeon(s) and Role:     * Luis Angel Lynn MD - Primary     * ZAK Nash MD-Assistant  Assisting Surgeon: None    Pre-op Diagnosis:  Closed displaced fracture of proximal phalanx of left little finger, initial encounter [S62.617A]    Post-op Diagnosis:  Post-Op Diagnosis Codes:     * Closed displaced fracture of proximal phalanx of left little finger, initial encounter [S62.617A]    Procedure(s) (LRB):  PINNING, FINGER, PERCUTANEOUS (Left)    Anesthesia: Regional    Description of the findings of the procedure: see op note    Findings/Key Components: see o pnote    Estimated Blood Loss: minimal      Specimens:   Specimen (12h ago, onward)    None          Discharge Note    SUMMARY     Admit Date: 10/23/2019    Discharge Date and Time: No discharge date for patient encounter.    Hospital Course (synopsis of major diagnoses, care, treatment, and services provided during the course of the hospital stay): the patient arrived to pre-op area for proper pre-operative management.  Upon completion of pre-operative preparation, the patient was taken back to the operative theatre.  A CRPP right P1 small finger was performed without complication and the patient was transported to the post anesthesia care unit in stable condition. After appropriate recovery from the anaesthetic agents used during the surgery the patient was  deemed safe for DC, they were discharged home.           Final Diagnosis: Post-Op Diagnosis Codes:     * Closed displaced fracture of proximal phalanx of left little finger, initial encounter [S62.617A]    Disposition: Home or Self Care    Follow Up/Patient Instructions:     Medications:  Reconciled Home Medications:      Medication List      CONTINUE taking these medications    ATIVAN 0.5 MG tablet  Generic drug:  LORazepam  Take 0.5 mg by mouth every 6 (six) hours as needed for Anxiety.      dextroamphetamine-amphetamine 15 mg tablet  Commonly known as:  ADDERALL  Take 15 mg by mouth 2 (two) times daily.     HYDROcodone-acetaminophen 5-325 mg per tablet  Commonly known as:  NORCO  Take 1 tablet by mouth every 6 (six) hours as needed for Pain.     levonorgestrel-ethinyl estradiol 0.15 mg-30 mcg (91) per tablet  Commonly known as:  SEASONALE  Take 1 tablet by mouth.     ondansetron 4 MG Tbdl  Commonly known as:  ZOFRAN-ODT  Take 1 tablet (4 mg total) by mouth every 12 (twelve) hours as needed.     zolpidem 10 mg Tab  Commonly known as:  AMBIEN  Take 1 tablet (10 mg total) by mouth nightly as needed (insomnia).          Discharge Procedure Orders   Ambulatory consult to Occupational Therapy   Referral Priority: Urgent Referral Type: Occupational Therapy   Referral Reason: Specialty Services Required   Requested Specialty: Occupational Therapy   Number of Visits Requested: 1     Diet general     Call MD for:  temperature >100.4     Call MD for:  persistent nausea and vomiting     Call MD for:  severe uncontrolled pain     Call MD for:  difficulty breathing, headache or visual disturbances     Call MD for:  redness, tenderness, or signs of infection (pain, swelling, redness, odor or green/yellow discharge around incision site)     Call MD for:  hives     Call MD for:  persistent dizziness or light-headedness     Call MD for:  extreme fatigue     Keep surgical extremity elevated     No driving, operating heavy equipment or signing legal documents while taking pain medication.     Leave dressing on - Keep it clean, dry, and intact until clinic visit

## 2019-10-23 NOTE — ANESTHESIA PROCEDURE NOTES
Supraclavicular Brachial Plexus Single Injection Block    Patient location during procedure: pre-op   Block not for primary anesthetic.  Reason for block: at surgeon's request and post-op pain management   Post-op Pain Location: left finger  Start time: 10/23/2019 11:46 AM  Timeout: 10/23/2019 11:45 AM   End time: 10/23/2019 11:59 AM    Staffing  Authorizing Provider: Yudi Day MD  Performing Provider: Yudi Day MD    Preanesthetic Checklist  Completed: patient identified, site marked, surgical consent, pre-op evaluation, timeout performed, IV checked, risks and benefits discussed and monitors and equipment checked  Peripheral Block  Patient position: supine  Prep: ChloraPrep  Patient monitoring: heart rate, cardiac monitor, continuous pulse ox, continuous capnometry and frequent blood pressure checks  Block type: supraclavicular  Laterality: left  Injection technique: single shot  Needle  Needle type: Stimuplex   Needle gauge: 22 G  Needle length: 2 in  Needle localization: anatomical landmarks and ultrasound guidance   -ultrasound image captured on disc.  Assessment  Injection assessment: negative aspiration, negative parasthesia and local visualized surrounding nerve  Paresthesia pain: none  Heart rate change: no  Slow fractionated injection: yes  Additional Notes  VSS.  [facility] RN monitoring vitals throughout procedure.  Patient tolerated procedure well.

## 2019-10-23 NOTE — OP NOTE
DATE OF PROCEDURE: 10/23/2019     SERVICE:  Orthopedic Surgery.     SURGEON:  Luis Angel Lynn M.D.     FIRST ASSISTANT:  Dewayne Alberto MD, RES     PREOPERATIVE DIAGNOSIS:    1) Displaced and angulated fracture left small finger proximal phalanx     POSTOPERATIVE DIAGNOSIS:     1) Displaced and angulated fracture left small finger proximal phalanx     PROCEDURE(S) PERFORMED:    1) Closed reduction and percutaneous pinning left small finger proximal phalanx extra-articular shaft fracture     TOURNIQUET TIME:   0 minutes at 250mmHg.     ESTIMATED BLOOD LOSS:  1 mL.     IMPLANTS:   0.045 Ruben wires x3     COMPLICATIONS:  None.     PACKS AND DRAINS:  None.     PATHOLOGIC SPECIMENS:  None.    ANESTHESIA:  Regional mac     IV FLUIDS: Crystalloid.     CONDITION:  Stable.    MICROBIOLOGY: None.    Indications for Procedure:    The patient is a 34-year-old female who previously sustained trauma to the left hand.  She was found to have a displaced and extended fracture of the left small finger proximal phalanx.    the risks, benefits and alternatives to surgery were discussed with the patient in detail.  Specific risks include but are not limited to bleeding, infection, vessel and/or nerve damage, pain, numbness, tingling, compartment syndrome, need for additional surgery, failure to return to pre-injury and/or preoperative functional status, inability to return to work, scar sensitivity, delayed healing, complex regional pain syndrome, weakness, pulley injury, tendon injury, bowstringing, partial and/or incomplete relief of symptoms, persistence of and/or worsening of symptoms, hardware and/or surgical failure, prominent and/or symptomatic hardware possibly necessitating future removal, osteomyelitis, amputation, loss of function, stiffness, rotational malalignment, functional debility, dysfunction, decreased  strength, need for prolonged postoperative rehabilitation, malunion, nonunion, deep venous thrombosis, pulmonary  embolism, arthritis and death.  The patient states an understanding and wishes to proceed with surgery.   All questions were answered.  No guarantees were implied or stated.  Written informed consent was obtained.    Procedure in detail:  On the date of the operative intervention the patient was evaluated in the preoperative holding area. With her participation the left hand and small finger was marked as the operative site. She was administered regional anesthesia taken the OR where she was placed on an OR table with the left upper extremity on hand table. Nonsterile tourniquet was placed high on the patient's left upper arm although it should be noted that it was never inflated. The left upper extremity was prepped and draped in the usual sterile fashion. Time-out was taken and confirmed by all present to confirm the correct patient site procedure in this tracing preoperative antibiotics.  All were in agreement so proceeded.  Fluoroscopy was brought into the field and a closed reduction maneuver was performed on the left small finger proximal phalanx.  Near-anatomic reduction was obtained with restoration of our length alignment rotation. I then held the MCP joint in slight flexion in intrinsic plus position and chose a 0.045 Ruben wire and drove this antegrade through the 5th metacarpal head spanning the metacarpophalangeal joint into the proximal phalanx to stabilize our proximal phalanx base fracture.  This did so nicely and I brought it to the subcortical bone of the proximal phalanx distally. I then chose a 2nd 0.045 Ruben wire and did the same.  Fixation was good but I decided to place a 3rd 0.045 Ruebn wire for additional stability from an ulnar direction entering the base of the proximal phalanx, across the fracture, and into the distal aspect of the small finger proximal phalanx.  After placement of these 3 Ruben wires fixation was excellent.  I then checked the rotational alignment of the  left small finger and hand.  Rotational alignment was anatomic no evidence of any rotational deformity or scissoring or crossover.  Three K-wires were then bent and clipped in the usual sterile fashion.  Final fluoroscopic x-rays were taken which verified placement of our hardware and good reduction of our fracture. Fluoroscopy was utilized throughout the duration of the procedure. Sterile dressings were then applied consisting of Xeroform around the K-wires, 4 x 4 gauze, cast padding, a Ena finger splint and a 2 in Ace wrap.  The patient was then awakened from anesthesia and returned to post anesthesia care unit stable conditions.  There were no complications as attending surgeon I was present and performed the entire procedure.    Postoperative plan for the patient patient will be discharged home in stable condition.  We will get her in occupational therapy early to encourage active flexion of the PIP and DIP joints in an attempt to minimize the risk of flexor tendon adhesions.  She is to remain nonweightbearing to left upper extremity at this time.

## 2019-10-23 NOTE — DISCHARGE INSTRUCTIONS
Attend therapy apt  Range of motion of distal phalanx  Follow up in  2 weeks  Pain control per prescription    -------------------------------------------------------------  Recovery After Procedural Sedation (Adult)  You have been given medicine by vein to make you sleep during your surgery. This may have included both a pain medicine and sleeping medicine. Most of the effects have worn off. But you may still have some drowsiness for the next 6 to 8 hours.  Home care  Follow these guidelines when you get home:  · For the next 8 hours, you should be watched by a responsible adult. This person should make sure your condition is not getting worse.  · Don't drink any alcohol for the next 24 hours.  · Don't drive, operate dangerous machinery, or make important business or personal decisions during the next 24 hours.  Note: Your healthcare provider may tell you not to take any medicine by mouth for pain or sleep in the next 4 hours. These medicines may react with the medicines you were given in the hospital. This could cause a much stronger response than usual.  Follow-up care  Follow up with your healthcare provider if you are not alert and back to your usual level of activity within 12 hours.  When to seek medical advice  Call your healthcare provider right away if any of these occur:  · Drowsiness gets worse  · Weakness or dizziness gets worse  · Repeated vomiting  · You can't be awakened   Date Last Reviewed: 10/18/2016  © 6544-3744 [employer]. [address] [place] [address], [place] [address]. All rights reserved. This information is not intended as a substitute for professional medical care. Always follow your healthcare professional's instructions.

## 2019-10-23 NOTE — ANESTHESIA POSTPROCEDURE EVALUATION
Anesthesia Post Evaluation    Patient: Rosy Lei    Procedure(s) Performed: Procedure(s) (LRB):  PINNING, FINGER, PERCUTANEOUS (Left)    Final Anesthesia Type: general  Patient location during evaluation: PACU  Patient participation: Yes- Able to Participate  Level of consciousness: awake and alert and oriented  Post-procedure vital signs: reviewed and stable  Pain management: adequate  Airway patency: patent  PONV status at discharge: No PONV  Anesthetic complications: no      Cardiovascular status: blood pressure returned to baseline and hemodynamically stable  Respiratory status: unassisted, room air and spontaneous ventilation  Hydration status: euvolemic  Follow-up not needed.          Vitals Value Taken Time   /58 10/23/2019  2:30 PM   Temp 36.5 °C (97.7 °F) 10/23/2019  2:30 PM   Pulse 72 10/23/2019  2:35 PM   Resp 14 10/23/2019  2:35 PM   SpO2 100 % 10/23/2019  2:30 PM   Vitals shown include unvalidated device data.      Event Time     Out of Recovery 14:20:00          Pain/James Score: Pain Rating Prior to Med Admin: 4 (10/23/2019  2:11 PM)  James Score: 9 (10/23/2019  2:00 PM)

## 2019-10-23 NOTE — ANESTHESIA PREPROCEDURE EVALUATION
10/22/2019  Rosy Lei is a 34 y.o., female with pmh listed below presenting for left finger pinning.    Past Medical History:   Diagnosis Date    Abnormal Pap smear of cervix     ADHD (attention deficit hyperactivity disorder)     Diabetes mellitus, type 2     Insomnia     Peptic ulcer disease          Anesthesia Evaluation    I have reviewed the Patient Summary Reports.     I have reviewed the Medications.     Review of Systems  Anesthesia Hx:  No problems with previous Anesthesia   Denies Personal Hx of Anesthesia complications.   Social:  Patient vapes daily   EENT/Dental:EENT/Dental Normal   Cardiovascular:   Exercise tolerance: good    Pulmonary:  Pulmonary Normal    Hepatic/GI:   PUD, (in the past)    Musculoskeletal:   Left little finger fracture   Neurological:  Neurology Normal    Endocrine:   Denies Diabetes.    Psych:   Psychiatric History (ADHD) anxiety          Physical Exam  General:  Well nourished    Airway/Jaw/Neck:  Airway Findings: Mouth Opening: Normal Tongue: Normal  General Airway Assessment: Adult  Mallampati: I  TM Distance: Normal, at least 6 cm  Jaw/Neck Findings:  Neck ROM: Normal ROM      Dental:  Dental Findings: In tact   Chest/Lungs:  Chest/Lungs Findings: Clear to auscultation, Normal Respiratory Rate     Heart/Vascular:  Heart Findings: Rate: Normal  Rhythm: Regular Rhythm  Sounds: Normal        Mental Status:  Mental Status Findings: Normal        Anesthesia Plan  Type of Anesthesia, risks & benefits discussed:  Anesthesia Type:  general, MAC, regional  Patient's Preference:   Intra-op Monitoring Plan: standard ASA monitors  Intra-op Monitoring Plan Comments:   Post Op Pain Control Plan: multimodal analgesia, IV/PO Opioids PRN and per primary service following discharge from PACU  Post Op Pain Control Plan Comments:   Induction:   IV  Beta Blocker:  Patient is not  currently on a Beta-Blocker (No further documentation required).       Informed Consent: Patient understands risks and agrees with Anesthesia plan.  Questions answered. Anesthesia consent signed with patient.  ASA Score: 2     Day of Surgery Review of History & Physical:    H&P update referred to the surgeon.         Ready For Surgery From Anesthesia Perspective.

## 2019-10-23 NOTE — TRANSFER OF CARE
"Anesthesia Transfer of Care Note    Patient: Rosy Lei    Procedure(s) Performed: Procedure(s) (LRB):  PINNING, FINGER, PERCUTANEOUS (Left)    Patient location: PACU    Anesthesia Type: regional and general    Transport from OR: Transported from OR on room air with adequate spontaneous ventilation    Post pain: adequate analgesia    Post assessment: no apparent anesthetic complications and tolerated procedure well    Post vital signs: stable    Level of consciousness: awake and sedated    Nausea/Vomiting: no nausea/vomiting    Complications: none    Transfer of care protocol was followed      Last vitals:   Visit Vitals  BP (!) 104/52 (BP Location: Right arm, Patient Position: Lying)   Pulse 66   Temp 36.6 °C (97.9 °F) (Oral)   Resp 16   Ht 5' 7" (1.702 m)   Wt 72.6 kg (160 lb)   LMP 10/20/2019 (Exact Date)   SpO2 99%   Breastfeeding? No   BMI 25.06 kg/m²     "

## 2019-10-23 NOTE — INTERVAL H&P NOTE
The patient has been examined and the H&P has been reviewed:    I concur with the findings and no changes have occurred since H&P was written.    Anesthesia/Surgery risks, benefits and alternative options discussed and understood by patient/family.    Pt reports some N/T in her fingertip today. Ready to proceed.    Active Hospital Problems    Diagnosis  POA    Open displaced fracture of proximal phalanx of left little finger [E45.260H]  Yes      Resolved Hospital Problems   No resolved problems to display.

## 2019-10-24 ENCOUNTER — PATIENT MESSAGE (OUTPATIENT)
Dept: ORTHOPEDICS | Facility: CLINIC | Age: 34
End: 2019-10-24

## 2019-10-24 VITALS
BODY MASS INDEX: 25.11 KG/M2 | TEMPERATURE: 98 F | DIASTOLIC BLOOD PRESSURE: 58 MMHG | SYSTOLIC BLOOD PRESSURE: 102 MMHG | HEIGHT: 67 IN | WEIGHT: 160 LBS | OXYGEN SATURATION: 100 % | HEART RATE: 75 BPM | RESPIRATION RATE: 20 BRPM

## 2019-10-24 LAB — POCT GLUCOSE: 104 MG/DL (ref 70–110)

## 2019-10-25 ENCOUNTER — CLINICAL SUPPORT (OUTPATIENT)
Dept: REHABILITATION | Facility: HOSPITAL | Age: 34
End: 2019-10-25
Attending: ORTHOPAEDIC SURGERY
Payer: COMMERCIAL

## 2019-10-25 DIAGNOSIS — M25.60 JOINT STIFFNESS: ICD-10-CM

## 2019-10-25 DIAGNOSIS — R29.898 HAND WEAKNESS: ICD-10-CM

## 2019-10-25 PROCEDURE — L3808 WHFO, RIGID W/O JOINTS: HCPCS | Mod: PN

## 2019-10-25 PROCEDURE — 97110 THERAPEUTIC EXERCISES: CPT | Mod: PN

## 2019-10-25 PROCEDURE — L3913 HFO W/O JOINTS CF: HCPCS

## 2019-10-25 PROCEDURE — 97165 OT EVAL LOW COMPLEX 30 MIN: CPT | Mod: PN

## 2019-10-25 NOTE — PLAN OF CARE
[facility] Occupational Therapy  Initial Evaluation     Date: 10/25/2019  Name: Rosy Lei  Clinic Number: [id number]    Therapy Diagnosis:   1. Joint stiffness     2. Hand weakness         Physician: Luis Angel Lynn MD    Physician Orders: Postop s/p CRPP left small finger proximal phalanx.  Patient has K wires across the MCP joint, but I would like to please initiate early active ROM of the left small finger at the PIP and DIP joints to try and prevent flexor tendon adhesions.  Please fabricate a custom MCP stabilizing orthosis to allow for PIP and DIP ROM as tolerated.      Medical Diagnosis: S62.617A (ICD-10-CM) - Closed displaced fracture of proximal phalanx of left little finger, initial encounter  Surgical Procedure and Date: PCP proximal phalanx, 10/23/19   Evaluation Date: 10/25/19  Insurance Authorization Period Expiration: 12/31/19  Plan of Care Certification Period: 10/25/19-1/17/20  Date of Return to MD: 11/5/19    Visit # / Visits authorized: 1 / pending  Time In:8:30 am  Time Out: 9:30 am  Total Billable Time: 60 minutes    Precautions:  Standard    Subjective     Involved Side: left  Dominant Side: Right  Date of Onset: 10/21/19  Mechanism of Injury:  Impact  History of Current Condition: Patient is a 34 year old female who reports on 10/21/19 she was doing exercise with her . She dove onto weight and broke her 5th metacarpal. She was seen by Dr. Lynn for PCP of the 5th metacarpal on 10/25/19. She arrives today for her post operative treatment.   Surgical Procedure: PCP to 5th metacarpal 10/21/19  Imaging: Please see PMH  Previous Therapy: Ankle    Patient's Goals for Therapy: To return to sports    Pain:  Functional Pain Scale Rating 0-10:   8/10 on average  8/10 at best  8/10 at worst  Location: Hand  Description: Aching and Throbbing  Aggravating Factors: Sitting  Easing Factors: pain medication, rest and elevation    Occupation:  In between jobs  Working presently:  unemployed  Duties: NA    Functional Limitations/Social History:    Previous functional status includes: Independent with all ADLs. I    Current FunctionalStatus   Home/Living environment : lives with their family      Limitation of Functional Status as follows:   ADLs/IADLs:     - Feeding: I    - Bathing: I    - Dressing/Grooming: I    - Driving: I     Leisure: Travel, Exercises, Golf      Past Medical History/Physical Systems Review:   Rosy Lei  has a past medical history of Abnormal Pap smear of cervix, ADHD (attention deficit hyperactivity disorder), Diabetes mellitus, type 2, Insomnia, and Peptic ulcer disease.    Rosy Lei  has a past surgical history that includes Cervical biopsy w/ loop electrode excision (2016); [place] tooth extraction; Breast surgery; and Percutaneous pinning of finger (Left, 10/23/2019).    Rosy has a current medication list which includes the following prescription(s): dextroamphetamine-amphetamine, hydrocodone-acetaminophen, levonorgestrel-ethinyl estradiol, lorazepam, ondansetron, and zolpidem.    Review of patient's allergies indicates:   Allergen Reactions    Tramadol Rash          Objective     Objective measures to be taken at a later date:   Visual inspection indicates edema and decreased PIP and IP flexion <20 degree NIX    CMS Impairment/Limitation/Restriction for FOTO hand Survey    Therapist reviewed FOTO scores for Rosy Lei on 10/25/2019.   FOTO documents entered into [employer] - see Media section.    Limitation Score: 55%  Category: Self Care       Treatment     Splint fabrication:  8:45 am -9:15 am   Pt was provided with MCP blocking orthosis to provide support for MCP joint 2' to 5th metacarpal fracture. Splint appears to fit well at this time. The patient was instructed in its wearing schedule and maintenance. Excellent fit achieved. Pt will follow up if he should experience numbness, pain or premature splint failure.     Treatment Time In: 9:15 am  Treatment  Time Out: 9:30 am  Total Treatment time separate from Evaluation time:15    Rosy received therapeutic exercises to develop strength, endurance, ROM, flexibility and posture for 15 minutes including:  -patient instructed in blocking exercise use of ice   - retrograde massage  hygrine and washing strategies discussed    Home Exercise Program/Education:  Issued HEP (see patient instructions in EMR) and educated on modality use for pain management . Exercises were reviewed and Rosy was able to demonstrate them prior to the end of the session.   Pt received a written copy of exercises to perform at home. Rosy demonstrated good  understanding of the education provided.  Pt was advised to perform these exercises free of pain, and to stop performing them if pain occurs.    Patient/Family Education: role of OT, goals for OT, scheduling/cancellations - pt verbalized understanding. Discussed insurance limitations with patient.    Additional Education provided: skin breakdown and hygiene strategies discussed    Assessment     Rosy Lei is a 34 y.o. female referred to outpatient occupational therapy and presents with a medical diagnosis of right hand MCP fracture PCP, resulting in Decreased ROM, Decreased  strength, Decreased pinch strength, Decreased muscle strength, Decreased functional hand use, Increased pain, Edema, Joint Stiffness and Scar Adhesions and demonstrates limitations as described in the chart below. Following medical record review it is determined that pt will benefit from occupational therapy services in order to maximize pain free and/or functional use of left hand. The following goals were discussed with the patient and patient is in agreement with them as to be addressed in the treatment plan. The patient's rehab potential is Excellent.     Anticipated barriers to occupational therapy: none  Pt has no cultural, educational or language barriers to learning provided.    Profile and History Assessment  of Occupational Performance Level of Clinical Decision Making Complexity Score   Occupational Profile:   Rosy Lei is a 34 y.o. female who lives with their family and is currently employed. Rosy Lei has difficulty with  feeding, bathing, grooming and dressing  driving/transportation management, shopping, phone/computer use, housework/household chores and medication management  affecting his/her daily functional abilities. His/her main goal for therapy is to return to golf.     Comorbidities:   none    Medical and Therapy History Review:   Brief               Performance Deficits    Physical:  Joint Mobility  Joint Stability  Muscle Power/Strength  Muscle Endurance  Skin Integrity/Scar Formation  Edema  Control of Voluntary Movement   Strength  Pinch Strength  Gross Motor Coordination  Fine Motor Coordination  Visual Functions  Proprioception Functions  Tactile Functions  Muscle Tone  Postural Control  Pain    Cognitive:  No Deficits    Psychosocial:    No Deficits     Clinical Decision Making:  low    Assessment Process:  Problem-Focused Assessments    Modification/Need for Assistance:  Not Necessary    Intervention Selection:  Several Treatment Options       low  Based on PMHX, co morbidities , data from assessments and functional level of assistance required with task and clinical presentation directly impacting function.       The following goals were discussed with the patient and patient is in agreement with them as to be addressed in the treatment plan.     Goals:     Goals to be met in 4 weeks: (11/22/19)  - Patient is independent with initial home exercise program to improve participation with ADL's.  - Pt to increase AROM of small finger PIP and DIP joint to WNL to improve with patients ability to functional utilize arm for typing.  - Pt to decrease pain to less than or equal to 4/10 while performing all ADL's.  - Patient will be able to achieve less than or equal to 30% limitation on the FOTO,  demonstrating overall improved functional ability with upper extremity.      Goals to be met by discharge:  - Patient is independent with advanced final home exercise program to improve participation with ADL's and IADL's.  - Pt to increase  strength to +/- 10 lbs of WNL to improve patients ability to squeeze toothpaste  - Pt to increase MCP AROM to WNL as compared to RUE by d/c to improve patients ability to hold a cup  - Pt to report decrease in pain to less than or equal to 0/10 when performing ADL's  - Patient will be able to achieve less than or equal to 5% limitation on the FOTO, demonstrating overall improved functional ability with upper extremity.         Plan   Certification Period/Plan of care expiration: 10/25/2019 to 1/17/20.    Outpatient Occupational Therapy 2 times weekly for 12 weeks to include the following interventions: Paraffin, Fluidotherapy, Manual therapy/joint mobilizations, Modalities for pain management, US 3 mhz, Therapeutic exercises/activities., Iontophoresis with 2.0 cc Dexamethasone, Strengthening, Orthotic Fabrication/Fit/Training, Edema Control, Scar Management, Wound Care, Electrical Modalities, Joint Protection and Energy Conservation.      Madi Montiel, OTR/L,CHT

## 2019-10-25 NOTE — PATIENT INSTRUCTIONS
"Blocking exercises for PIP flexion/extension    Block your knuckle joint (MPJ) from bending while focusing on bending the middle joint of your finger (PIP) like shown.    HAND TENDON GLIDES    Perform the following series of movements with your hand.     Start with an open palm (center image) and then bend your fingers to a claw hand as in the upper left image. Next, return to an open palm and then to an "L" hand" as shown in the upper right image. Next, return to an open palm and then make a fist as in the bottom left image. Next, return to an open palm and then make a flat fist with finger pads in your palm as in the bottom right image.  Finally, return to an open palm and then repeat the series.     Edema Control (Control of Swelling)- General Guidelines for the Hand    1) Keep affected hand elevated above heart level as much as possible.  2) Perform retrograde massage- apply hand lotion to elevated hand & massage the lotion from the finger tips down to the wrist. massage down only, for about 5 minutes, 3-5x per day  3) Raise hand over head & open/close fist 20 x every hour.  4) Apply ice 10-15 minutes 3x per day       "

## 2019-11-01 ENCOUNTER — PATIENT MESSAGE (OUTPATIENT)
Dept: ORTHOPEDICS | Facility: CLINIC | Age: 34
End: 2019-11-01

## 2019-11-01 ENCOUNTER — PATIENT MESSAGE (OUTPATIENT)
Dept: ADMINISTRATIVE | Facility: OTHER | Age: 34
End: 2019-11-01

## 2019-11-05 ENCOUNTER — PATIENT MESSAGE (OUTPATIENT)
Dept: ORTHOPEDICS | Facility: CLINIC | Age: 34
End: 2019-11-05

## 2019-11-05 ENCOUNTER — OFFICE VISIT (OUTPATIENT)
Dept: ORTHOPEDICS | Facility: CLINIC | Age: 34
End: 2019-11-05
Payer: COMMERCIAL

## 2019-11-05 DIAGNOSIS — S62.617B OPEN DISPLACED FRACTURE OF PROXIMAL PHALANX OF LEFT LITTLE FINGER, INITIAL ENCOUNTER: ICD-10-CM

## 2019-11-05 DIAGNOSIS — L03.012 CELLULITIS OF FINGER OF LEFT HAND: Primary | ICD-10-CM

## 2019-11-05 PROCEDURE — 99024 POSTOP FOLLOW-UP VISIT: CPT | Mod: S$GLB,,, | Performed by: ORTHOPAEDIC SURGERY

## 2019-11-05 PROCEDURE — 99024 PR POST-OP FOLLOW-UP VISIT: ICD-10-PCS | Mod: S$GLB,,, | Performed by: ORTHOPAEDIC SURGERY

## 2019-11-05 PROCEDURE — 99999 PR PBB SHADOW E&M-EST. PATIENT-LVL II: CPT | Mod: PBBFAC,,, | Performed by: ORTHOPAEDIC SURGERY

## 2019-11-05 PROCEDURE — 99999 PR PBB SHADOW E&M-EST. PATIENT-LVL II: ICD-10-PCS | Mod: PBBFAC,,, | Performed by: ORTHOPAEDIC SURGERY

## 2019-11-05 RX ORDER — SULFAMETHOXAZOLE AND TRIMETHOPRIM 800; 160 MG/1; MG/1
1 TABLET ORAL 2 TIMES DAILY
Qty: 28 TABLET | Refills: 0 | Status: SHIPPED | OUTPATIENT
Start: 2019-11-05 | End: 2019-11-19

## 2019-11-05 RX ORDER — SULFAMETHOXAZOLE AND TRIMETHOPRIM 800; 160 MG/1; MG/1
1 TABLET ORAL 2 TIMES DAILY
Qty: 28 TABLET | Refills: 0 | Status: SHIPPED | OUTPATIENT
Start: 2019-11-05 | End: 2019-11-05

## 2019-11-05 NOTE — PROGRESS NOTES
Rosy Lei presents for post-operative evaluation.  The patient is now 2 weeks s/p CRPP left small finger proximal phalanx.  She notes that 1 of her pin sites has been filling somewhat painful with some erythema and mild drainage.  She has seen occupational therapy and was fabricated a custom orthosis to keep the MCP joint immobilized while allowing PIP and DIP range of motion as tolerated.  She denies any constitutional symptoms and presents today for initial postoperative follow-up.      PE:    AA&O x 4.  NAD  HEENT:  NCAT, sclera nonicteric  Lungs:  Respirations are equal and unlabored.  CV:  2+ bilateral upper and lower extremity pulses.  MSK: The wound is healing well.  Pin sites intact.  One of the 3 pin sites in particular has mild surrounding erythema and some scant yellow purulent discharge and this pin was removed today in clinic.  The wound was then milled and there was no significant deep underlying fluctuance or infection.  No additional drainage. Left upper extremity is neurovascularly intact in the patient has good active flexion of the PIP and DIP joints of the left small finger.      A/P: Status post above, doing well  1) Continue with non weight bearing.  Continue active DIP and PIP flexion exercises.  We will send a course of Bactrim to her pharmacy for the pin tract infection.  The patient will call and contact clinic should her infection appear to worsen or drainage persist or increase.  2) F/U 1 weeks for removal of remainder pins  3) Call with any questions/concerns in the interim     Please be aware that this note has been generated with the assistance of [employer] voice-to-text.  Please excuse any spelling or grammatical errors.

## 2019-11-11 NOTE — PROGRESS NOTES
"  Occupational Therapy Daily Treatment Note     Date: 11/12/2019  Name: Rosy Lei  Clinic Number: [id number]    Therapy Diagnosis:   1. Joint stiffness     2. Hand weakness         Physician: Luis Angel Lynn MD    Physician Orders: Postop s/p CRPP left small finger proximal phalanx.  Patient has K wires across the MCP joint, but I would like to please initiate early active ROM of the left small finger at the PIP and DIP joints to try and prevent flexor tendon adhesions.  Please fabricate a custom MCP stabilizing orthosis to allow for PIP and DIP ROM as tolerated.        Medical Diagnosis: S62.617A (ICD-10-CM) - Closed displaced fracture of proximal phalanx of left little finger, initial encounter  Surgical Procedure and Date: PCP proximal phalanx, 10/23/19   Evaluation Date: 10/25/19  Insurance Authorization Period Expiration: 12/31/19  Plan of Care Certification Period: 10/25/19-1/17/20  Date of Return to MD: 11/5/19     Visit # / Visits authorized: 2 / 5  Time In:2:00 pm  Time Out: 2:40 pm  Total Billable Time: 60 minutes     Precautions:  Standard      Subjective     Pt reports: "It got infected, He took one of the pins out. I have the rest removed Thursday"  she was compliant with home exercise program given last session.   Response to previous treatment: positive  Functional change: Patient has decrease AROM    Pain: 8/10  Location: left hands      Objective     Rosy received therapeutic exercises to develop strength, endurance, ROM, flexibility and posture for the left hand for 20 minutes including:  Isolated tendon glides x 20   Splint modification for new strapping  Protective compressive sleeve utilized with foam core to protect pins      Rosy received the following manual therapy techniques: applied to the: left hand for 15 minutes, including:  Gentle retrograde massage x 10  Use of vibratory tool on hand to reduce edema x 5 min    Rosy received the following supervised modalities after being cleared " for contradictions: Patient received paraffin bath to left hand(s) for 5 minutes to increase blood flow, circulation, pain management and for tissue elasticity prior to therex.         Home Exercises and Education Provided     Education provided:   - Continue to use edema mgmt strategies   - Progress towards goals     Written Home Exercises Provided: Patient instructed to cont prior HEP.  Exercises were reviewed and Rosy was able to demonstrate them prior to the end of the session.  Rosy demonstrated good  understanding of the HEP provided.   .   See EMR under Patient Instructions for exercises provided prior visit.        Assessment     Pt returns following single poin removal 2' to infection. Pt will have both pins removed on 11/14/19. AROM in hand appears to be maintained. Extensor tightness and intrinsic musle pain noted 2' to immobilization. Patient to follow up after pins removed.      Rosy is progressing well towards her goals and there are no updates to goals at this time. Pt prognosis is Excellent.     Pt will continue to benefit from skilled outpatient occupational therapy to address the deficits listed in the problem list on initial evaluation provide pt/family education and to maximize pt's level of independence in the home and community environment.     Anticipated barriers to occupational therapy: Infection    Pt's spiritual, cultural and educational needs considered and pt agreeable to plan of care and goals.    Goals:  Goals to be met in 4 weeks: (11/22/19)  - Patient is independent with initial home exercise program to improve participation with ADL's. NOT MET  - Pt to increase AROM of small finger PIP and DIP joint to WNL to improve with patients ability to functional utilize arm for typing. NOT MET  - Pt to decrease pain to less than or equal to 4/10 while performing all ADL's. NOT MET  - Patient will be able to achieve less than or equal to 30% limitation on the FOTO, demonstrating overall  improved functional ability with upper extremity. NOT MET     Goals to be met by discharge:  - Patient is independent with advanced final home exercise program to improve participation with ADL's and IADL's. NOT MET  - Pt to increase  strength to +/- 10 lbs of WNL to improve patients ability to squeeze toothpaste NOT MET  - Pt to increase MCP AROM to WNL as compared to RUE by d/c to improve patients ability to hold a cup NOT MET  - Pt to report decrease in pain to less than or equal to 0/10 when performing ADL's NOT MET  - Patient will be able to achieve less than or equal to 5% limitation on the FOTO, demonstrating overall improved functional ability with upper extremity. NOT MET    Plan   Continue with occupational therapy POC to progress patient to improved participation with ADL's and IADL's.   Updates/Grading for next session: Restore patient to NATALY Montiel, OTR/L,CHT

## 2019-11-12 ENCOUNTER — CLINICAL SUPPORT (OUTPATIENT)
Dept: REHABILITATION | Facility: HOSPITAL | Age: 34
End: 2019-11-12
Attending: ORTHOPAEDIC SURGERY
Payer: COMMERCIAL

## 2019-11-12 DIAGNOSIS — M25.60 JOINT STIFFNESS: ICD-10-CM

## 2019-11-12 DIAGNOSIS — R29.898 HAND WEAKNESS: ICD-10-CM

## 2019-11-12 PROCEDURE — 97140 MANUAL THERAPY 1/> REGIONS: CPT | Mod: PN

## 2019-11-12 PROCEDURE — 97110 THERAPEUTIC EXERCISES: CPT | Mod: PN

## 2019-11-14 ENCOUNTER — HOSPITAL ENCOUNTER (OUTPATIENT)
Dept: RADIOLOGY | Facility: HOSPITAL | Age: 34
Discharge: HOME | End: 2019-11-14
Attending: ORTHOPAEDIC SURGERY
Payer: COMMERCIAL

## 2019-11-14 ENCOUNTER — OFFICE VISIT (OUTPATIENT)
Dept: ORTHOPEDICS | Facility: CLINIC | Age: 34
End: 2019-11-14
Payer: COMMERCIAL

## 2019-11-14 DIAGNOSIS — S62.617A CLOSED DISPLACED FRACTURE OF PROXIMAL PHALANX OF LEFT LITTLE FINGER, INITIAL ENCOUNTER: ICD-10-CM

## 2019-11-14 DIAGNOSIS — B37.9 YEAST INFECTION: ICD-10-CM

## 2019-11-14 DIAGNOSIS — S62.617A CLOSED DISPLACED FRACTURE OF PROXIMAL PHALANX OF LEFT LITTLE FINGER, INITIAL ENCOUNTER: Primary | ICD-10-CM

## 2019-11-14 PROCEDURE — 73130 X-RAY EXAM OF HAND: CPT | Mod: 26,LT,, | Performed by: RADIOLOGY

## 2019-11-14 PROCEDURE — 99024 POSTOP FOLLOW-UP VISIT: CPT | Mod: S$GLB,,, | Performed by: ORTHOPAEDIC SURGERY

## 2019-11-14 PROCEDURE — 99024 PR POST-OP FOLLOW-UP VISIT: ICD-10-PCS | Mod: S$GLB,,, | Performed by: ORTHOPAEDIC SURGERY

## 2019-11-14 PROCEDURE — 73130 XR HAND COMPLETE 3 VIEW LEFT: ICD-10-PCS | Mod: 26,LT,, | Performed by: RADIOLOGY

## 2019-11-14 PROCEDURE — 99999 PR PBB SHADOW E&M-EST. PATIENT-LVL II: ICD-10-PCS | Mod: PBBFAC,,, | Performed by: ORTHOPAEDIC SURGERY

## 2019-11-14 PROCEDURE — 99999 PR PBB SHADOW E&M-EST. PATIENT-LVL II: CPT | Mod: PBBFAC,,, | Performed by: ORTHOPAEDIC SURGERY

## 2019-11-14 PROCEDURE — 73130 X-RAY EXAM OF HAND: CPT | Mod: TC,LT

## 2019-11-14 RX ORDER — FLUCONAZOLE 150 MG/1
150 TABLET ORAL DAILY
Qty: 1 TABLET | Refills: 0 | Status: SHIPPED | OUTPATIENT
Start: 2019-11-14 | End: 2019-11-15

## 2019-11-14 NOTE — PROGRESS NOTES
Rosy Lei presents for post-operative evaluation.  The patient is now 3 weeks s/p CRPP left small finger proximal phalanx.  She reports that she is doing very well and has been regaining range of motion of the left small finger without event. She had some slight drainage from a pin site last visit for which 1 of her 3 pins was removed and she was placed on Bactrim.  She notes she has tolerated this well and has had no additional drainage although she does note that she has developed a yeast infection from her antibiotic.  She has no other new complaints today and she presents for routine postop evaluation.    PE:    AA&O x 4.  NAD  HEENT:  NCAT, sclera nonicteric  Lungs:  Respirations are equal and unlabored.  CV:  2+ bilateral upper and lower extremity pulses.  MSK: The wound is healing well.  Pin sites intact.  No drainage today.  Pins pulled..  The patient has good active range of motion of the left small finger with no evidence of any rotational deformity.    Imaging:  Status post CR PP left small finger proximal phalanx fracture in good alignment with no evidence of complication    A/P: Status post above, doing well  1) Continue with non weight bearing.  The patient may continue active DIP and PIP flexion as tolerated in her splint with occupational therapy.     2) F/U 3 weeks with new x-rays.  Will send Diflucan to pharmacy for yeast infection.  3) Call with any questions/concerns in the interim     Please be aware that this note has been generated with the assistance of [facility] voice-to-text.  Please excuse any spelling or grammatical errors.

## 2019-11-15 ENCOUNTER — TELEPHONE (OUTPATIENT)
Dept: INTERNAL MEDICINE | Facility: CLINIC | Age: 34
End: 2019-11-15

## 2019-11-15 ENCOUNTER — PATIENT MESSAGE (OUTPATIENT)
Dept: ORTHOPEDICS | Facility: CLINIC | Age: 34
End: 2019-11-15

## 2019-11-18 ENCOUNTER — CLINICAL SUPPORT (OUTPATIENT)
Dept: REHABILITATION | Facility: HOSPITAL | Age: 34
End: 2019-11-18
Payer: COMMERCIAL

## 2019-11-18 DIAGNOSIS — R29.898 HAND WEAKNESS: ICD-10-CM

## 2019-11-18 DIAGNOSIS — M25.60 JOINT STIFFNESS: ICD-10-CM

## 2019-11-18 PROCEDURE — 97110 THERAPEUTIC EXERCISES: CPT | Mod: PN

## 2019-11-18 PROCEDURE — 97022 WHIRLPOOL THERAPY: CPT | Mod: PN

## 2019-11-18 PROCEDURE — 97763 ORTHC/PROSTC MGMT SBSQ ENC: CPT | Mod: PN

## 2019-11-18 NOTE — PROGRESS NOTES
"  Occupational Therapy Daily Treatment Note     Date: 11/18/2019  Name: Rosy Lei  Clinic Number: [id number]    Therapy Diagnosis:   1. Joint stiffness     2. Hand weakness         Physician: Luis Angel Lynn MD    Physician Orders: Continue orthosis wear x 3 more weeks. May start gentle MP motion this week per MD verbal order.        Medical Diagnosis: S62.617A (ICD-10-CM) - Closed displaced fracture of proximal phalanx of left little finger, initial encounter  Surgical Procedure and Date: CRPP (Closed reduction percutaneous pinning) proximal phalanx, 10/23/19   Evaluation Date: 10/25/19  Insurance Authorization Period Expiration: 12/31/19  Plan of Care Certification Period: 10/25/19-1/17/20  Date of Return to MD: 12/03/2019     Visit # / Visits authorized: 3 / 5  Time In:1:00 pm  Time Out: 1:45pm  Total Billable Time: 30 minutes     Precautions:  Standard- Per MD verbal- may start gentle MP motion this week      Subjective     Pt reports: " The splint isn't fitting now that the pins are out"   she was compliant with home exercise program given last session.   Response to previous treatment: positive  Functional change: Patient has decrease AROM    Pain: 4/10  Location: left hands      Objective       Edema. Measured in centimeters.   11/18/2019 11/18/2019    Left Right   MPs 20.5 20.5   Small:        P1           5.7 6.5     PIP        5.4 6.0   P2        4.8 5.8             Hand ROM. Measured in degrees.   11/18/2019 11/18/2019    Left Right    AROM AROM        Small:  MP 98 18/80                80               DIP 75 55               197                  Rosy received the following supervised modalities for 10 min after being cleared for contradictions:  *Covered pin sites still healing with tegaderm. Fluidotherapy: To left hand, 115 deg, air speed 50 to decrease pain, edema & scar tissue, sensory re- education, and increased tissue extensibility prior to therex      Rosy received the following " manual therapy techniques x 5 min: applied to the: left hand  Gentle retrograde massage to small finger        Rosy received therapeutic exercises to left hand for 25 minutes including:    AROM    DIP blocking  PIP blocking  Gentle wave  Gentle hook  Gentle straight fist  Gentle composite fist   X 10 reps each    Light desensitization X 3 min beans gross grasp   Medium pom pom ulnar scoop X 3 containers                   Home Exercises and Education Provided     Education provided: Cont orthosis wear as instructed by MD, do HEP 10  Reps, 3 x day very gentle ROM to MPs.   - Continue to use edema mgmt strategies   - Progress towards goals     Written Home Exercises Provided: Patient instructed to cont prior HEP.  Exercises were reviewed and Rosy was able to demonstrate them prior to the end of the session.  Rosy demonstrated good  understanding of the HEP provided.   .   See EMR under Patient Instructions for exercises provided initial eval.        Assessment     Pt. With pin sites that are scabbed over. Pt. States she is finished with the antibiotics.  She states she is compliant with lifting resitrictions and HEP. AROM objective measurements taken this date as well as edema. Pt. To perform very gentle MP motion with tendon glides this date. Pt. With slight soreness after session. Added desensitization due to pt complaints of sensory deficits along hand and small finger. Fabricated and applied an MP blocking orthosis for pt due to previous orthotic no longer fitting due to pin removal- unable to reheat and remold previous orthotic. Pt. Reports comfort of fit following application. Pt. To wear continuous- remove for hygiene, exercises. Pt. To decrease AROM exercises to 10 repetitions 3 x day. Pt. Verbalized understanding.     Rosy is progressing well towards her goals and there are no updates to goals at this time. Pt prognosis is Excellent.     Pt will continue to benefit from skilled outpatient occupational  therapy to address the deficits listed in the problem list on initial evaluation provide pt/family education and to maximize pt's level of independence in the home and community environment.     Anticipated barriers to occupational therapy: none    Pt's spiritual, cultural and educational needs considered and pt agreeable to plan of care and goals.    Goals:  Goals to be met in 4 weeks: (11/22/19)  - Patient is independent with initial home exercise program to improve participation with ADL's. -progressing  - Pt to increase AROM of small finger PIP and DIP joint to WNL to improve with patients ability to functional utilize arm for typing. -progressing  - Pt to decrease pain to less than or equal to 4/10 while performing all ADL's. -progressing  - Patient will be able to achieve less than or equal to 30% limitation on the FOTO, demonstrating overall improved functional ability with upper extremity. -progressing     Goals to be met by discharge:  - Patient is independent with advanced final home exercise program to improve participation with ADL's and IADL's. -progressing  - Pt to increase  strength to +/- 10 lbs of WNL to improve patients ability to squeeze toothpaste -progressing  - Pt to increase MCP AROM to WNL as compared to RUE by d/c to improve patients ability to hold a cup -progressing  - Pt to report decrease in pain to less than or equal to 0/10 when performing ADL's -progressing  - Patient will be able to achieve less than or equal to 5% limitation on the FOTO, demonstrating overall improved functional ability with upper extremity. -progressing    Plan   Continue with occupational therapy to increase independent participation in daily occupations.     Pt. Plans to be out of town from 11/21/2019 until 12/01/2019 and will f/u upon return to town.     Updates/Grading for next session: Cont to progress per protocol with gentle AROM and functional use only. No strengthening at this time      Pati Weinberg  OTR/L,CHT

## 2019-11-19 ENCOUNTER — OFFICE VISIT (OUTPATIENT)
Dept: INTERNAL MEDICINE | Facility: CLINIC | Age: 34
End: 2019-11-19
Payer: COMMERCIAL

## 2019-11-19 VITALS
WEIGHT: 142.19 LBS | SYSTOLIC BLOOD PRESSURE: 120 MMHG | BODY MASS INDEX: 22.32 KG/M2 | DIASTOLIC BLOOD PRESSURE: 80 MMHG | HEART RATE: 71 BPM | RESPIRATION RATE: 16 BRPM | TEMPERATURE: 98 F | HEIGHT: 67 IN

## 2019-11-19 DIAGNOSIS — Z15.89 GENE MUTATION: Primary | ICD-10-CM

## 2019-11-19 DIAGNOSIS — Z00.00 WELL ADULT EXAM: Primary | ICD-10-CM

## 2019-11-19 PROCEDURE — 99999 PR PBB SHADOW E&M-EST. PATIENT-LVL III: ICD-10-PCS | Mod: PBBFAC,,, | Performed by: INTERNAL MEDICINE

## 2019-11-19 PROCEDURE — 99395 PREV VISIT EST AGE 18-39: CPT | Mod: S$GLB,,, | Performed by: INTERNAL MEDICINE

## 2019-11-19 PROCEDURE — 99999 PR PBB SHADOW E&M-EST. PATIENT-LVL III: CPT | Mod: PBBFAC,,, | Performed by: INTERNAL MEDICINE

## 2019-11-19 PROCEDURE — 99395 PR PREVENTIVE VISIT,EST,18-39: ICD-10-PCS | Mod: S$GLB,,, | Performed by: INTERNAL MEDICINE

## 2019-11-19 RX ORDER — ONDANSETRON 8 MG/1
8 TABLET, ORALLY DISINTEGRATING ORAL EVERY 12 HOURS PRN
Qty: 60 TABLET | Refills: 3
Start: 2019-11-19 | End: 2020-01-14 | Stop reason: SDUPTHER

## 2019-11-19 RX ORDER — ONDANSETRON 8 MG/1
8 TABLET, ORALLY DISINTEGRATING ORAL EVERY 12 HOURS PRN
Qty: 60 TABLET | Refills: 3 | Status: SHIPPED | OUTPATIENT
Start: 2019-11-19 | End: 2019-11-19 | Stop reason: SDUPTHER

## 2019-11-19 RX ORDER — ZOLPIDEM TARTRATE 10 MG/1
10 TABLET ORAL NIGHTLY PRN
Qty: 30 TABLET | Refills: 3 | Status: SHIPPED | OUTPATIENT
Start: 2019-11-19 | End: 2020-01-14 | Stop reason: SDUPTHER
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
## 2019-11-20 ENCOUNTER — LAB VISIT (OUTPATIENT)
Dept: LAB | Facility: HOSPITAL | Age: 34
End: 2019-11-20
Attending: INTERNAL MEDICINE
Payer: COMMERCIAL

## 2019-11-20 DIAGNOSIS — Z00.00 WELL ADULT EXAM: ICD-10-CM

## 2019-11-20 LAB
25(OH)D3+25(OH)D2 SERPL-MCNC: 39 NG/ML (ref 30–96)
ALBUMIN SERPL BCP-MCNC: 4.1 G/DL (ref 3.5–5.2)
ALP SERPL-CCNC: 36 U/L (ref 55–135)
ALT SERPL W/O P-5'-P-CCNC: 27 U/L (ref 10–44)
ANION GAP SERPL CALC-SCNC: 10 MMOL/L (ref 8–16)
AST SERPL-CCNC: 23 U/L (ref 10–40)
BASOPHILS # BLD AUTO: 0.04 K/UL (ref 0–0.2)
BASOPHILS NFR BLD: 0.6 % (ref 0–1.9)
BILIRUB SERPL-MCNC: 0.3 MG/DL (ref 0.1–1)
BUN SERPL-MCNC: 15 MG/DL (ref 6–20)
CALCIUM SERPL-MCNC: 9.6 MG/DL (ref 8.7–10.5)
CHLORIDE SERPL-SCNC: 108 MMOL/L (ref 95–110)
CHOLEST SERPL-MCNC: 158 MG/DL (ref 120–199)
CHOLEST/HDLC SERPL: 2.3 {RATIO} (ref 2–5)
CO2 SERPL-SCNC: 22 MMOL/L (ref 23–29)
CREAT SERPL-MCNC: 1 MG/DL (ref 0.5–1.4)
DIFFERENTIAL METHOD: ABNORMAL
EOSINOPHIL # BLD AUTO: 0.1 K/UL (ref 0–0.5)
EOSINOPHIL NFR BLD: 1 % (ref 0–8)
ERYTHROCYTE [DISTWIDTH] IN BLOOD BY AUTOMATED COUNT: 12 % (ref 11.5–14.5)
EST. GFR  (AFRICAN AMERICAN): >60 ML/MIN/1.73 M^2
EST. GFR  (NON AFRICAN AMERICAN): >60 ML/MIN/1.73 M^2
ESTIMATED AVG GLUCOSE: 91 MG/DL (ref 68–131)
GLUCOSE SERPL-MCNC: 103 MG/DL (ref 70–110)
HBA1C MFR BLD HPLC: 4.8 % (ref 4–5.6)
HCT VFR BLD AUTO: 38.5 % (ref 37–48.5)
HDLC SERPL-MCNC: 70 MG/DL (ref 40–75)
HDLC SERPL: 44.3 % (ref 20–50)
HGB BLD-MCNC: 12.6 G/DL (ref 12–16)
IMM GRANULOCYTES # BLD AUTO: 0.01 K/UL (ref 0–0.04)
IMM GRANULOCYTES NFR BLD AUTO: 0.1 % (ref 0–0.5)
LDLC SERPL CALC-MCNC: 78.4 MG/DL (ref 63–159)
LYMPHOCYTES # BLD AUTO: 2.9 K/UL (ref 1–4.8)
LYMPHOCYTES NFR BLD: 42 % (ref 18–48)
MCH RBC QN AUTO: 32.7 PG (ref 27–31)
MCHC RBC AUTO-ENTMCNC: 32.7 G/DL (ref 32–36)
MCV RBC AUTO: 100 FL (ref 82–98)
MONOCYTES # BLD AUTO: 0.4 K/UL (ref 0.3–1)
MONOCYTES NFR BLD: 6.1 % (ref 4–15)
NEUTROPHILS # BLD AUTO: 3.4 K/UL (ref 1.8–7.7)
NEUTROPHILS NFR BLD: 50.2 % (ref 38–73)
NONHDLC SERPL-MCNC: 88 MG/DL
NRBC BLD-RTO: 0 /100 WBC
PLATELET # BLD AUTO: 306 K/UL (ref 150–350)
PMV BLD AUTO: 11 FL (ref 9.2–12.9)
POTASSIUM SERPL-SCNC: 4.2 MMOL/L (ref 3.5–5.1)
PROT SERPL-MCNC: 7.2 G/DL (ref 6–8.4)
RBC # BLD AUTO: 3.85 M/UL (ref 4–5.4)
SODIUM SERPL-SCNC: 140 MMOL/L (ref 136–145)
TRIGL SERPL-MCNC: 48 MG/DL (ref 30–150)
TSH SERPL DL<=0.005 MIU/L-ACNC: 1.67 UIU/ML (ref 0.4–4)
WBC # BLD AUTO: 6.83 K/UL (ref 3.9–12.7)

## 2019-11-20 PROCEDURE — 80061 LIPID PANEL: CPT

## 2019-11-20 PROCEDURE — 36415 COLL VENOUS BLD VENIPUNCTURE: CPT | Mod: PO

## 2019-11-20 PROCEDURE — 83036 HEMOGLOBIN GLYCOSYLATED A1C: CPT

## 2019-11-20 PROCEDURE — 85025 COMPLETE CBC W/AUTO DIFF WBC: CPT

## 2019-11-20 PROCEDURE — 84443 ASSAY THYROID STIM HORMONE: CPT

## 2019-11-20 PROCEDURE — 82306 VITAMIN D 25 HYDROXY: CPT

## 2019-11-20 PROCEDURE — 80053 COMPREHEN METABOLIC PANEL: CPT

## 2019-12-02 ENCOUNTER — CLINICAL SUPPORT (OUTPATIENT)
Dept: REHABILITATION | Facility: HOSPITAL | Age: 34
End: 2019-12-02
Payer: COMMERCIAL

## 2019-12-02 DIAGNOSIS — M25.60 JOINT STIFFNESS: ICD-10-CM

## 2019-12-02 DIAGNOSIS — R29.898 HAND WEAKNESS: ICD-10-CM

## 2019-12-02 PROCEDURE — 97110 THERAPEUTIC EXERCISES: CPT | Mod: PN

## 2019-12-02 PROCEDURE — 97022 WHIRLPOOL THERAPY: CPT | Mod: PN

## 2019-12-02 NOTE — PROGRESS NOTES
"  Occupational Therapy Daily Treatment Note     Date: 12/2/2019  Name: Rosy Lei  Clinic Number: [id number]    Therapy Diagnosis:   1. Joint stiffness     2. Hand weakness         Physician: Luis Angel Lynn MD    Physician Orders: Continue orthosis wear x 3 more weeks. May start gentle MP motion this week per MD verbal order.        Medical Diagnosis: S62.617A (ICD-10-CM) - Closed displaced fracture of proximal phalanx of left little finger, initial encounter  Surgical Procedure and Date: CRPP (Closed reduction percutaneous pinning) proximal phalanx, 10/23/19   Evaluation Date: 10/25/19  Insurance Authorization Period Expiration: 12/31/19  Plan of Care Certification Period: 10/25/19-1/17/20  Date of Return to MD: 12/03/2019     Visit # / Visits authorized: 4 / 5 *FOTO NEXT SESSION*  Time In:2pm  Time Out:3ppm  Total Billable Time: 40 minutes     Precautions:  Standard- Per MD verbal- may start gentle MP motion this week      Subjective     Pt reports: " The splint isn't fitting now that the pins are out"   she was compliant with home exercise program given last session.   Response to previous treatment: positive  Functional change: Patient has decrease AROM    Pain: 4/10  Location: left hands      Objective       Edema. Measured in centimeters.   11/18/2019 11/18/2019    Left Right   MPs 20.5 20.5   Small:        P1           5.7 6.5     PIP        5.4 6.0   P2        4.8 5.8             Hand ROM. Measured in degrees.   11/18/2019 11/18/2019 12/2/2019    Left Right Right    AROM AROM AROM         Small:  MP 98 18/80 14/80                80 92               DIP 75 55 60               197 218 (+21)                   Rosy received the following supervised modalities for 20 min after being cleared for contradictions:   Fluidotherapy: To left hand, 115 deg, air speed 50 to decrease pain, edema & scar tissue, sensory re- education, and increased tissue extensibility prior to therex    Patient received cold " pack x 5 minutes to decrease pain/inflammation and edema following treatment session.           Rosy received the following manual therapy techniques x 2 min: applied to the: left hand  Gentle retrograde massage to small finger        Rosy received therapeutic exercises to left hand for 38 minutes including:    AROM    DIP blocking  PIP blocking  Gentle wave  Gentle hook  Gentle straight fist  Gentle composite fist   X 10 reps each    isospheres X 3 min   Light desensitization X 3 min beans gross grasp   Medium pom pom ulnar scoop X 3 containers                 Home Exercises and Education Provided     Education provided: Cont orthosis wear as instructed by MD, do HEP 10  Reps, 3 x day very gentle ROM to MPs.   - Continue to use edema mgmt strategies   - Progress towards goals     Written Home Exercises Provided: Patient instructed to cont prior HEP.  Exercises were reviewed and Rosy was able to demonstrate them prior to the end of the session.  Rosy demonstrated good  understanding of the HEP provided.   .   See EMR under Patient Instructions for exercises provided initial eval.        Assessment   Pt is 5 weeks 5 days post op. Pin sites are healed completely. She states she still has pain in the hand and did use it some with the brace over the holiday break to [employer]. She has an area of localized swelling at the fracture site still present. AROM with improvements yet continues to be painful ~4/10 pain. Pt. To perform very gentle MP motion with tendon glides this date. Pt. With slight soreness after session. Applied Ice to hand post session for pain and swelling.     Rosy is progressing well towards her goals and there are no updates to goals at this time. Pt prognosis is Excellent.     Pt will continue to benefit from skilled outpatient occupational therapy to address the deficits listed in the problem list on initial evaluation provide pt/family education and to maximize pt's level of independence  in the home and community environment.     Anticipated barriers to occupational therapy: none    Pt's spiritual, cultural and educational needs considered and pt agreeable to plan of care and goals.    Goals:  Goals to be met in 4 weeks: (11/22/19)  - Patient is independent with initial home exercise program to improve participation with ADL's. -progressing  - Pt to increase AROM of small finger PIP and DIP joint to WNL to improve with patients ability to functional utilize arm for typing. -progressing  - Pt to decrease pain to less than or equal to 4/10 while performing all ADL's. -progressing  - Patient will be able to achieve less than or equal to 30% limitation on the FOTO, demonstrating overall improved functional ability with upper extremity. -progressing     Goals to be met by discharge:  - Patient is independent with advanced final home exercise program to improve participation with ADL's and IADL's. -progressing  - Pt to increase  strength to +/- 10 lbs of WNL to improve patients ability to squeeze toothpaste -progressing  - Pt to increase MCP AROM to WNL as compared to RUE by d/c to improve patients ability to hold a cup -progressing  - Pt to report decrease in pain to less than or equal to 0/10 when performing ADL's -progressing  - Patient will be able to achieve less than or equal to 5% limitation on the FOTO, demonstrating overall improved functional ability with upper extremity. -progressing    Plan   Continue with occupational therapy to increase independent participation in daily occupations.     Pt. Plans to be out of town from 11/21/2019 until 12/01/2019 and will f/u upon return to town.     Updates/Grading for next session: Cont to progress per protocol with gentle AROM and functional use only. No strengthening at this time      Pati Weinberg, OTR/L,CHT

## 2019-12-03 ENCOUNTER — HOSPITAL ENCOUNTER (OUTPATIENT)
Dept: RADIOLOGY | Facility: HOSPITAL | Age: 34
Discharge: HOME | End: 2019-12-03
Attending: ORTHOPAEDIC SURGERY
Payer: COMMERCIAL

## 2019-12-03 ENCOUNTER — OFFICE VISIT (OUTPATIENT)
Dept: ORTHOPEDICS | Facility: CLINIC | Age: 34
End: 2019-12-03
Payer: COMMERCIAL

## 2019-12-03 VITALS — WEIGHT: 142.19 LBS | BODY MASS INDEX: 22.32 KG/M2 | HEIGHT: 67 IN

## 2019-12-03 DIAGNOSIS — S62.617A CLOSED DISPLACED FRACTURE OF PROXIMAL PHALANX OF LEFT LITTLE FINGER, INITIAL ENCOUNTER: ICD-10-CM

## 2019-12-03 DIAGNOSIS — S62.617A CLOSED DISPLACED FRACTURE OF PROXIMAL PHALANX OF LEFT LITTLE FINGER, INITIAL ENCOUNTER: Primary | ICD-10-CM

## 2019-12-03 PROCEDURE — 99024 PR POST-OP FOLLOW-UP VISIT: ICD-10-PCS | Mod: S$GLB,,, | Performed by: ORTHOPAEDIC SURGERY

## 2019-12-03 PROCEDURE — 73130 X-RAY EXAM OF HAND: CPT | Mod: 26,LT,, | Performed by: RADIOLOGY

## 2019-12-03 PROCEDURE — 99999 PR PBB SHADOW E&M-EST. PATIENT-LVL II: CPT | Mod: PBBFAC,,, | Performed by: ORTHOPAEDIC SURGERY

## 2019-12-03 PROCEDURE — 73130 X-RAY EXAM OF HAND: CPT | Mod: TC,LT

## 2019-12-03 PROCEDURE — 99999 PR PBB SHADOW E&M-EST. PATIENT-LVL II: ICD-10-PCS | Mod: PBBFAC,,, | Performed by: ORTHOPAEDIC SURGERY

## 2019-12-03 PROCEDURE — 99024 POSTOP FOLLOW-UP VISIT: CPT | Mod: S$GLB,,, | Performed by: ORTHOPAEDIC SURGERY

## 2019-12-03 PROCEDURE — 73130 XR HAND COMPLETE 3 VIEW LEFT: ICD-10-PCS | Mod: 26,LT,, | Performed by: RADIOLOGY

## 2019-12-03 NOTE — PROGRESS NOTES
Rosy Lei presents for post-operative evaluation.  The patient is now 6 weeks s/p CRPP left small finger proximal phalanx (DOS: 10/23/19).  She reports that she is doing well and has been regaining range of motion of the left small finger without event. She has been attending occupational therapy as prescribed and continuing with their range of motion protocol without any difficulty.  She has no significant pain in the finger and mild residual stiffness.  She denies numbness tingling or other complaints today and she is doing well with no new complaints.      PE:    AA&O x 4.  NAD  HEENT:  NCAT, sclera nonicteric  Lungs:  Respirations are equal and unlabored.  CV:  2+ bilateral upper and lower extremity pulses.  MSK: The wound is healing well.  Pin sites intact.  No drainage today. The patient can nearly make a full composite fist with the left hand and small finger without any great difficulty.  PIP and DIP motion is excellent as is MCP motion.  Tendons are gliding well.  Neurovascularly intact left upper extremity.      Imaging:  Status post CR PP left small finger proximal phalanx fracture in good alignment with no evidence of complication    A/P: Status post above, doing well  1) Continue with non weight bearing.  The patient may continue range of motion as tolerated without restrictions.  She is to keep her splint on during daytime but she may remove it for range of motion exercises and therapy.   2) F/U 6 weeks with new x-rays  3) Call with any questions/concerns in the interim     Please be aware that this note has been generated with the assistance of [facility] voice-to-text.  Please excuse any spelling or grammatical errors.      
Airway patent, nasal mucosa clear, mouth with normal mucosa. Throat has no vesicles, no oropharyngeal exudates and uvula is midline. Clear tympanic membranes bilaterally.

## 2019-12-05 ENCOUNTER — CLINICAL SUPPORT (OUTPATIENT)
Dept: REHABILITATION | Facility: HOSPITAL | Age: 34
End: 2019-12-05
Payer: COMMERCIAL

## 2019-12-05 DIAGNOSIS — M25.60 JOINT STIFFNESS: ICD-10-CM

## 2019-12-05 DIAGNOSIS — R29.898 HAND WEAKNESS: ICD-10-CM

## 2019-12-05 PROCEDURE — 97110 THERAPEUTIC EXERCISES: CPT | Mod: PN

## 2019-12-05 PROCEDURE — 97018 PARAFFIN BATH THERAPY: CPT | Mod: PN

## 2019-12-05 NOTE — PROGRESS NOTES
"  Occupational Therapy Daily Treatment Note     Date: 12/5/2019  Name: Rosy Lei  Clinic Number: [id number]    Therapy Diagnosis:   1. Joint stiffness     2. Hand weakness         Physician: Luis Angel Lynn MD    Physician Orders: Continue orthosis wear x 3 more weeks. May start gentle MP motion this week per MD verbal order.        Medical Diagnosis: S62.617A (ICD-10-CM) - Closed displaced fracture of proximal phalanx of left little finger, initial encounter  Surgical Procedure and Date: CRPP (Closed reduction percutaneous pinning) proximal phalanx, 10/23/19   Evaluation Date: 10/25/19  Insurance Authorization Period Expiration: 12/31/19  Plan of Care Certification Period: 10/25/19-1/17/20  Date of Return to MD: 12/03/2019     Visit # / Visits authorized: 5 / 5 * Auth submitted for  Time In:1030am  Time Out:1130am  Total Billable Time: 40 minutes     Precautions:  Standard- Per MD verbal- may start gentle MP motion this week      Subjective     Pt reports: " The doctor said it was doing better,he still wants me to come to therapy"   she was compliant with home exercise program given last session.   Response to previous treatment: positive  Functional change: Patient has decrease AROM    Pain: 4/10  Location: left hands      Objective       Edema. Measured in centimeters.   11/18/2019 11/18/ 2019 12/5/2019    Left Right Right   MPs 20.5 20.5 20.4   Small:         P1           5.7 6.5 6.0     PIP        5.4 6.0 5.7   P2        4.8 5.8 4.8             Hand ROM. Measured in degrees.   11/18/2019 11/18/2019 12/2/2019 12/05/2019    Left Right Right Right    AROM AROM AROM AROM          Small:  MP 98 18/80 14/80 10/80                80 92 90               DIP 75 55 60 55               197 218 (+21) 217 (=)                    Rosy received the following supervised modalities for 20 min after being cleared for contradictions:  Patient received paraffin bath to left hand hand with moist heat pack x 15 " "minutes to increase blood flow, circulation, pain management and for tissue elasticity prior to therex.       Patient received cold pack x 5 minutes to decrease pain/inflammation and edema following treatment session.           Rosy received the following manual therapy techniques x 2 min: applied to the: left hand  Gentle retrograde massage to small finger        Rosy received therapeutic exercises to left hand for 38 minutes including:    AROM    DIP blocking  PIP blocking  Gentle wave  Gentle hook  Gentle straight fist  Gentle composite fist   X 10 reps each    isospheres X 3 min   Light desensitization X 3 min beans gross grasp   Medium pom pom ulnar scoop X 3 containers                 Home Exercises and Education Provided     Education provided: Cont orthosis wear as instructed by MD, do HEP 10  Reps, 3 x day very gentle ROM to MPs.   - Continue to use edema mgmt strategies   - Progress towards goals     Written Home Exercises Provided: Patient instructed to cont prior HEP.  Exercises were reviewed and Rosy was able to demonstrate them prior to the end of the session.  Rosy demonstrated good  understanding of the HEP provided.   .   See EMR under Patient Instructions for exercises provided initial eval.        Assessment   Pt is 6 weeks post op. Per MD report " Continue with non weight bearing.  The patient may continue range of motion as tolerated without restrictions.  She is to keep her splint on during daytime but she may remove it for range of motion exercises and therapy."  . AROM with slight improvement and swelling is decreasing. She has an area of localized swelling at the fracture site still present. AROM with improvements yet continues to be painful ~4/10 pain. Pt. To perform very gentle MP motion with tendon glides this date. Pt. With slight soreness after session. Applied Ice to hand post session for pain and swelling.     Rosy is progressing well towards her goals and there are no updates to " goals at this time. Pt prognosis is Excellent.     Pt will continue to benefit from skilled outpatient occupational therapy to address the deficits listed in the problem list on initial evaluation provide pt/family education and to maximize pt's level of independence in the home and community environment.     Anticipated barriers to occupational therapy: none    Pt's spiritual, cultural and educational needs considered and pt agreeable to plan of care and goals.    Goals:  Goals to be met in 4 weeks: (11/22/19)  - Patient is independent with initial home exercise program to improve participation with ADL's. -progressing  - Pt to increase AROM of small finger PIP and DIP joint to WNL to improve with patients ability to functional utilize arm for typing. -progressing  - Pt to decrease pain to less than or equal to 4/10 while performing all ADL's. -progressing  - Patient will be able to achieve less than or equal to 30% limitation on the FOTO, demonstrating overall improved functional ability with upper extremity. -progressing     Goals to be met by discharge:  - Patient is independent with advanced final home exercise program to improve participation with ADL's and IADL's. -progressing  - Pt to increase  strength to +/- 10 lbs of WNL to improve patients ability to squeeze toothpaste -progressing  - Pt to increase MCP AROM to WNL as compared to RUE by d/c to improve patients ability to hold a cup -progressing  - Pt to report decrease in pain to less than or equal to 0/10 when performing ADL's -progressing  - Patient will be able to achieve less than or equal to 5% limitation on the FOTO, demonstrating overall improved functional ability with upper extremity. -progressing    Plan   Continue with occupational therapy to increase independent participation in daily occupations.         Updates/Grading for next session: Cont to progress per protocol with gentle AROM and functional use only. No strengthening at this  time      Pati Weinberg, OTR/L,CHT

## 2019-12-19 ENCOUNTER — CLINICAL SUPPORT (OUTPATIENT)
Dept: REHABILITATION | Facility: HOSPITAL | Age: 34
End: 2019-12-19
Payer: COMMERCIAL

## 2019-12-19 DIAGNOSIS — M25.60 JOINT STIFFNESS: ICD-10-CM

## 2019-12-19 DIAGNOSIS — R29.898 HAND WEAKNESS: ICD-10-CM

## 2019-12-19 PROCEDURE — 97022 WHIRLPOOL THERAPY: CPT | Mod: PN

## 2019-12-19 PROCEDURE — 97110 THERAPEUTIC EXERCISES: CPT | Mod: PN

## 2019-12-19 NOTE — PROGRESS NOTES
"  Occupational Therapy Daily Treatment Note     Date: 12/19/2019  Name: Rosy Lei  Clinic Number: [id number]    Therapy Diagnosis:   1. Joint stiffness     2. Hand weakness         Physician: Luis Angel Lynn MD    Physician Orders: Continue orthosis wear x 3 more weeks. May start gentle MP motion this week per MD verbal order.        Medical Diagnosis: S62.617A (ICD-10-CM) - Closed displaced fracture of proximal phalanx of left little finger, initial encounter  Surgical Procedure and Date: CRPP (Closed reduction percutaneous pinning) proximal phalanx, 10/23/19   Evaluation Date: 10/25/19  Insurance Authorization Period Expiration: 12/31/19  Plan of Care Certification Period: 10/25/19-1/17/20  Date of Return to MD: 12/03/2019     Visit # / Visits authorized: 6 / 15  Time In:955am  Time Out:1055am  Total Billable Time: 40 minutes     Precautions:  Standard- Per MD verbal- may start gentle MP motion this week      Subjective     Pt reports: " It is moving better but the pain is still the same. "   she was compliant with home exercise program given last session.   Response to previous treatment: positive  Functional change: Patient has decrease AROM    Pain: 4/10  Location: left hands      Objective       Edema. Measured in centimeters.   11/18/2019 11/18/ 2019 12/5/2019    Left Right Right   MPs 20.5 20.5 20.4   Small:         P1           5.7 6.5 6.0     PIP        5.4 6.0 5.7   P2        4.8 5.8 4.8             Hand ROM. Measured in degrees.   11/18/2019 11/18/2019 12/2/2019 12/05/2019    Left Right Right Right    AROM AROM AROM AROM          Small:  MP 98 18/80 14/80 10/80                80 92 90               DIP 75 55 60 55               197 218 (+21) 217 (=)                    Rosy received the following supervised modalities for 20 min after being cleared for contradictions:  Patient received paraffin bath to left hand hand with moist heat pack x 15 minutes to increase blood flow, circulation, " pain management and for tissue elasticity prior to therex.       Patient received cold pack x 5 minutes to decrease pain/inflammation and edema following treatment session.           Rosy received the following manual therapy techniques x 2 min: applied to the: left hand  Gentle retrograde massage to small finger        Rosy received therapeutic exercises to left hand for 38 minutes including:    AROM    DIP blocking  PIP blocking  wave  hook  straight fist  composite fist  EDM   X 10 reps each    isospheres X 3 min   In hand manipulation X 1 container coins   small pom pom ulnar scoop X 3 containers   Octy  X 3 trials                     Home Exercises and Education Provided     Education provided: Cont orthosis wear as instructed by MD, do HEP 10  Reps, 3 x day very gentle ROM to MPs, add EDM, smaller pom poms,  And towel scrunches over holiday break  - Continue to use edema mgmt strategies   - Progress towards goals     Written Home Exercises Provided: Patient instructed to cont prior HEP.  Exercises were reviewed and Rosy was able to demonstrate them prior to the end of the session.  Rosy demonstrated good  understanding of the HEP provided.   .   See EMR under Patient Instructions for exercises provided initial eval.        Assessment   Pt is 8 weeks post op.  . She has an area of localized swelling at the fracture site still present. AROM with improvements yet continues to be painful ~4/10 pain.  Increased activities with good participation this date. . Pt. With slight soreness after session. Applied Ice to hand post session for pain and swelling.     Rosy is progressing well towards her goals and there are no updates to goals at this time. Pt prognosis is Excellent.     Pt will continue to benefit from skilled outpatient occupational therapy to address the deficits listed in the problem list on initial evaluation provide pt/family education and to maximize pt's level of independence in the home and  community environment.     Anticipated barriers to occupational therapy: none    Pt's spiritual, cultural and educational needs considered and pt agreeable to plan of care and goals.    Goals:  Goals to be met in 4 weeks: (11/22/19)  - Patient is independent with initial home exercise program to improve participation with ADL's. -progressing  - Pt to increase AROM of small finger PIP and DIP joint to WNL to improve with patients ability to functional utilize arm for typing. -progressing  - Pt to decrease pain to less than or equal to 4/10 while performing all ADL's. -progressing  - Patient will be able to achieve less than or equal to 30% limitation on the FOTO, demonstrating overall improved functional ability with upper extremity. -progressing     Goals to be met by discharge:  - Patient is independent with advanced final home exercise program to improve participation with ADL's and IADL's. -progressing  - Pt to increase  strength to +/- 10 lbs of WNL to improve patients ability to squeeze toothpaste -progressing  - Pt to increase MCP AROM to WNL as compared to RUE by d/c to improve patients ability to hold a cup -progressing  - Pt to report decrease in pain to less than or equal to 0/10 when performing ADL's -progressing  - Patient will be able to achieve less than or equal to 5% limitation on the FOTO, demonstrating overall improved functional ability with upper extremity. -progressing    Plan   Continue with occupational therapy to increase independent participation in daily occupations.         Updates/Grading for next session: Cont to progress per protocol with gentle AROM and functional use only. No strengthening at this time      Pati Weinberg, OTR/L,CHT

## 2020-01-02 ENCOUNTER — CLINICAL SUPPORT (OUTPATIENT)
Dept: REHABILITATION | Facility: HOSPITAL | Age: 35
End: 2020-01-02
Payer: COMMERCIAL

## 2020-01-02 DIAGNOSIS — R29.898 HAND WEAKNESS: ICD-10-CM

## 2020-01-02 DIAGNOSIS — M25.60 JOINT STIFFNESS: ICD-10-CM

## 2020-01-02 PROCEDURE — 97022 WHIRLPOOL THERAPY: CPT | Mod: PN

## 2020-01-02 PROCEDURE — 97110 THERAPEUTIC EXERCISES: CPT | Mod: PN

## 2020-01-02 NOTE — PROGRESS NOTES
"  Occupational Therapy Daily Treatment Note     Date: 1/2/2020  Name: Rosy Lei  Clinic Number: [id number]    Therapy Diagnosis:   1. Joint stiffness     2. Hand weakness         Physician: Luis Angel Lynn MD    Physician Orders: 12/3/2019: Continue orthosis wear x 3 more weeks. May start gentle MP motion this week per MD verbal order.        Medical Diagnosis: S62.617A (ICD-10-CM) - Closed displaced fracture of proximal phalanx of left little finger, initial encounter  Surgical Procedure and Date: CRPP (Closed reduction percutaneous pinning) proximal phalanx, 10/23/19   Evaluation Date: 10/25/19  Insurance Authorization Period Expiration: 12/31/19  Plan of Care Certification Period: 10/25/19-1/17/20  Date of Return to MD: 01/14/2020     Visit # / Visits authorized: 7 / 15  Time In:155pm  Time Out:250pm  Total Billable Time: 40 minutes     Precautions:  Standard      Subjective     Pt reports: "  It only seems to hurt at the top of the knuckle now "   she was compliant with home exercise program given last session.   Response to previous treatment: positive  Functional change: Patient has decrease AROM    Pain: 3/10  Location: left hands      Objective       Edema. Measured in centimeters.   11/18/2019 11/18/ 2019 12/5/2019 01/2/2020    Left Right Right Right   MPs 20.5 20.5 20.4    Small:          P1           5.7 6.5 6.0 6.2     PIP        5.4 6.0 5.7 5.5   P2        4.8 5.8 4.8 4.8             Hand ROM. Measured in degrees.   11/18/2019 11/18/2019 12/2/2019 12/05/2019 01/02/2020    Left Right Right Right Right    AROM AROM AROM AROM AROM           Small:  MP 98 18/80 14/80 10/80 75                80 92 90 5/90               DIP 75 55 60 55 65               197 218 (+21) 217 (=) 225 (+8)                     Rosy received the following supervised modalities for 15 min after being cleared for contradictions:  Patient received paraffin bath to left hand hand with moist heat pack x 15 minutes to " increase blood flow, circulation, pain management and for tissue elasticity prior to therex.               Rosy received the following manual therapy techniques x 2 min: applied to the: left hand  Gentle retrograde massage to small finger        Rosy received therapeutic exercises to left hand for 38 minutes including:    AROM    DIP blocking  PIP blocking  wave  hook  straight fist  composite fist  EDM   X 10 reps each    isospheres X 3 min   In hand manipulation X 1 container coins   small pom pom ulnar scoop X 3 containers   Octy  X 3 trials   Digi- flex X 2/10 reps x 1.5                 Home Exercises and Education Provided     Education provided: Cont orthosis wear as instructed by MD, do HEP 10  Reps, 3 x day very gentle ROM to MPs, add EDM, smaller pom poms,  And towel scrunches over holiday break  - Continue to use edema mgmt strategies   - Progress towards goals     Written Home Exercises Provided: Patient instructed to cont prior HEP.  Exercises were reviewed and Rosy was able to demonstrate them prior to the end of the session.  Rosy demonstrated good  understanding of the HEP provided.   .   See EMR under Patient Instructions for exercises provided initial eval.        Assessment   Pt is 10 weeks post op.  AROM with improvements with less pain.  Increased activities with good participation this date. . Pt. With slight soreness after session. Pt. To apply ice at home if needed.      Rosy is progressing well towards her goals and there are no updates to goals at this time. Pt prognosis is Excellent.     Pt will continue to benefit from skilled outpatient occupational therapy to address the deficits listed in the problem list on initial evaluation provide pt/family education and to maximize pt's level of independence in the home and community environment.     Anticipated barriers to occupational therapy: none    Pt's spiritual, cultural and educational needs considered and pt agreeable to plan of care  and goals.    Goals:  Goals to be met in 4 weeks: (11/22/19)  - Patient is independent with initial home exercise program to improve participation with ADL's. -progressing  - Pt to increase AROM of small finger PIP and DIP joint to WNL to improve with patients ability to functional utilize arm for typing. -progressing  - Pt to decrease pain to less than or equal to 4/10 while performing all ADL's. -progressing  - Patient will be able to achieve less than or equal to 30% limitation on the FOTO, demonstrating overall improved functional ability with upper extremity. -progressing     Goals to be met by discharge:  - Patient is independent with advanced final home exercise program to improve participation with ADL's and IADL's. -progressing  - Pt to increase  strength to +/- 10 lbs of WNL to improve patients ability to squeeze toothpaste -progressing  - Pt to increase MCP AROM to WNL as compared to RUE by d/c to improve patients ability to hold a cup -progressing  - Pt to report decrease in pain to less than or equal to 0/10 when performing ADL's -progressing  - Patient will be able to achieve less than or equal to 5% limitation on the FOTO, demonstrating overall improved functional ability with upper extremity. -progressing    Plan   Continue with occupational therapy to increase independent participation in daily occupations.         Updates/Grading for next session: Cont to progress per protocol with gentle AROM and functional use only. No strengthening at this time      Pati Weinberg, OTR/L,CHT

## 2020-01-14 ENCOUNTER — PATIENT MESSAGE (OUTPATIENT)
Dept: ORTHOPEDICS | Facility: CLINIC | Age: 35
End: 2020-01-14

## 2020-01-14 RX ORDER — ONDANSETRON 8 MG/1
8 TABLET, ORALLY DISINTEGRATING ORAL EVERY 12 HOURS PRN
Qty: 60 TABLET | Refills: 3 | Status: ON HOLD | OUTPATIENT
Start: 2020-01-14 | End: 2023-03-12 | Stop reason: HOSPADM

## 2020-01-14 RX ORDER — ZOLPIDEM TARTRATE 10 MG/1
10 TABLET ORAL NIGHTLY PRN
Qty: 30 TABLET | Refills: 3 | Status: SHIPPED | OUTPATIENT
Start: 2020-01-14 | End: 2020-05-29 | Stop reason: SDUPTHER

## 2020-05-04 ENCOUNTER — TELEPHONE (OUTPATIENT)
Dept: DERMATOLOGY | Facility: CLINIC | Age: 35
End: 2020-05-04

## 2020-05-04 NOTE — TELEPHONE ENCOUNTER
Spoke to pt., [place] and cancelled her appointment.----- Message from Taina Trujillo sent at 5/4/2020 11:05 AM CDT -----  Contact: pt at [phone]  Kasi pt-Pt  Has a NP appt scheduled for tomorrow at 0220 May 5.  Pt wants to see if she can convert to a virtual appt.

## 2020-06-01 ENCOUNTER — OFFICE VISIT (OUTPATIENT)
Dept: DERMATOLOGY | Facility: CLINIC | Age: 35
End: 2020-06-01
Payer: COMMERCIAL

## 2020-06-01 DIAGNOSIS — L70.0 ACNE VULGARIS: Primary | ICD-10-CM

## 2020-06-01 PROCEDURE — 99202 OFFICE O/P NEW SF 15 MIN: CPT | Mod: S$GLB,,, | Performed by: PHYSICIAN ASSISTANT

## 2020-06-01 PROCEDURE — 99999 PR PBB SHADOW E&M-EST. PATIENT-LVL II: ICD-10-PCS | Mod: PBBFAC,,, | Performed by: PHYSICIAN ASSISTANT

## 2020-06-01 PROCEDURE — 99202 PR OFFICE/OUTPT VISIT, NEW, LEVL II, 15-29 MIN: ICD-10-PCS | Mod: S$GLB,,, | Performed by: PHYSICIAN ASSISTANT

## 2020-06-01 PROCEDURE — 99999 PR PBB SHADOW E&M-EST. PATIENT-LVL II: CPT | Mod: PBBFAC,,, | Performed by: PHYSICIAN ASSISTANT

## 2020-06-01 RX ORDER — DOXYCYCLINE 100 MG/1
TABLET ORAL
Qty: 30 TABLET | Refills: 2 | Status: ON HOLD | OUTPATIENT
Start: 2020-06-01 | End: 2023-03-12 | Stop reason: HOSPADM

## 2020-06-01 RX ORDER — ZOLPIDEM TARTRATE 10 MG/1
10 TABLET ORAL NIGHTLY PRN
Qty: 30 TABLET | Refills: 3 | Status: SHIPPED | OUTPATIENT
Start: 2020-06-01 | End: 2020-09-29 | Stop reason: SDUPTHER

## 2020-06-01 RX ORDER — MUPIROCIN 20 MG/G
OINTMENT TOPICAL
Qty: 22 G | Refills: 2 | Status: ON HOLD | OUTPATIENT
Start: 2020-06-01 | End: 2023-03-12 | Stop reason: HOSPADM

## 2020-06-01 RX ORDER — CLINDAMYCIN PHOSPHATE 10 MG/G
GEL TOPICAL
Qty: 30 G | Refills: 2 | Status: ON HOLD | OUTPATIENT
Start: 2020-06-01 | End: 2023-03-12 | Stop reason: HOSPADM

## 2020-06-01 NOTE — PROGRESS NOTES
Subjective:       Patient ID:  Rosy Lei is a 35 y.o. female who presents for   Chief Complaint   Patient presents with    Recurrent Skin Infections     face, X7mos, boils/ cyst, TX: salt josé luis & face wash      History of Present Illness: The patient presents with chief complaint of boils.  Location: face  Duration: 1 yr  Signs/Symptoms: painful, swollen, red  Prior treatments: salt water, otc facial cleansers    Has been on accutane 3 times in the past - 2011 last course. Hx of ocular cellulitis August 2019 - recurrent boils started after that and have been persistent.  Reports hx of impetigo and multiple other bacterial infxns.    Review of Systems   HENT: Positive for headaches.    Eyes: Positive for visual change (has appt with ophthalmology).   Gastrointestinal: Negative for Sensitivity to oral antibiotics.   Genitourinary: Negative for irregular periods (IUD in Jan. 2020).   Skin: Positive for daily sunscreen use and activity-related sunscreen use. Negative for recent sunburn.   Neurological: Positive for headaches.        Objective:    Physical Exam   Constitutional: She appears well-developed and well-nourished. No distress.   Neurological: She is alert and oriented to person, place, and time. She is not disoriented.   Psychiatric: She has a normal mood and affect.   Skin:   Areas Examined (abnormalities noted in diagram):   Head / Face Inspection Performed  Neck Inspection Performed  Chest / Axilla Inspection Performed              Diagram Legend     Erythematous scaling macule/papule c/w actinic keratosis       Vascular papule c/w angioma      Pigmented verrucoid papule/plaque c/w seborrheic keratosis      Yellow umbilicated papule c/w sebaceous hyperplasia      Irregularly shaped tan macule c/w lentigo     1-2 mm smooth white papules consistent with Milia      Movable subcutaneous cyst with punctum c/w epidermal inclusion cyst      Subcutaneous movable cyst c/w pilar cyst      Firm pink to brown  papule c/w dermatofibroma      Pedunculated fleshy papule(s) c/w skin tag(s)      Evenly pigmented macule c/w junctional nevus     Mildly variegated pigmented, slightly irregular-bordered macule c/w mildly atypical nevus      Flesh colored to evenly pigmented papule c/w intradermal nevus       Pink pearly papule/plaque c/w basal cell carcinoma      Erythematous hyperkeratotic cursted plaque c/w SCC      Surgical scar with no sign of skin cancer recurrence      Open and closed comedones      Inflammatory papules and pustules      Verrucoid papule consistent consistent with wart     Erythematous eczematous patches and plaques     Dystrophic onycholytic nail with subungual debris c/w onychomycosis     Umbilicated papule    Erythematous-base heme-crusted tan verrucoid plaque consistent with inflamed seborrheic keratosis     Erythematous Silvery Scaling Plaque c/w Psoriasis     See annotation    Assessment / Plan:      Acne vulgaris (large cystic nodules)  -     doxycycline monohydrate 100 mg Tab; Take 1 po qday  Dispense: 30 tablet; Refill: 2  -     clindamycin phosphate 1% (CLINDAGEL) 1 % gel; Apply to face bid.  Dispense: 30 g; Refill: 2  -     mupirocin (BACTROBAN) 2 % ointment; Apply to nares bid.  Dispense: 22 g; Refill: 2    Discussed benefits and risks of doxycyline therapy including but not limited to GI discomfort, esophageal irritation/ulceration, and increased sun sensitivity. Patient was counseled to take medicine with meals and at least 1 hour before lying down.     Recommend washing face twice daily - once with a gentle skin cleanser and once with over the counter benzoyl peroxide cleanser.         Follow up in about 2 months (around 8/1/2020).

## 2020-06-01 NOTE — PATIENT INSTRUCTIONS
Discussed benefits and risks of doxycyline therapy including but not limited to GI discomfort, esophageal irritation/ulceration, and increased sun sensitivity. Patient was counseled to take medicine with meals and at least 1 hour before lying down.     Recommend washing face twice daily - once with a gentle skin cleanser and once with over the counter benzoyl peroxide cleanser.

## 2020-06-18 ENCOUNTER — TELEPHONE (OUTPATIENT)
Dept: INTERNAL MEDICINE | Facility: CLINIC | Age: 35
End: 2020-06-18

## 2020-06-18 DIAGNOSIS — Z20.822 EXPOSURE TO COVID-19 VIRUS: Primary | ICD-10-CM

## 2020-06-23 ENCOUNTER — LAB VISIT (OUTPATIENT)
Dept: LAB | Facility: HOSPITAL | Age: 35
End: 2020-06-23
Attending: INTERNAL MEDICINE
Payer: COMMERCIAL

## 2020-06-23 DIAGNOSIS — Z20.822 EXPOSURE TO COVID-19 VIRUS: ICD-10-CM

## 2020-06-23 LAB — SARS-COV-2 IGG SERPLBLD QL IA.RAPID: NEGATIVE

## 2020-06-23 PROCEDURE — 36415 COLL VENOUS BLD VENIPUNCTURE: CPT | Mod: PO

## 2020-06-23 PROCEDURE — 86769 SARS-COV-2 COVID-19 ANTIBODY: CPT

## 2021-04-22 ENCOUNTER — PATIENT MESSAGE (OUTPATIENT)
Dept: DERMATOLOGY | Facility: CLINIC | Age: 36
End: 2021-04-22

## 2021-04-22 DIAGNOSIS — L70.0 ACNE VULGARIS: ICD-10-CM

## 2021-04-22 RX ORDER — MUPIROCIN 20 MG/G
OINTMENT TOPICAL
Qty: 22 G | Refills: 2 | OUTPATIENT
Start: 2021-04-22

## 2021-04-22 RX ORDER — DOXYCYCLINE 100 MG/1
TABLET ORAL
Qty: 30 TABLET | Refills: 2 | OUTPATIENT
Start: 2021-04-22

## 2021-04-22 RX ORDER — CLINDAMYCIN PHOSPHATE 10 MG/G
GEL TOPICAL
Qty: 30 G | Refills: 2 | OUTPATIENT
Start: 2021-04-22

## 2021-04-26 ENCOUNTER — PATIENT MESSAGE (OUTPATIENT)
Dept: RESEARCH | Facility: HOSPITAL | Age: 36
End: 2021-04-26

## 2023-03-02 ENCOUNTER — HOSPITAL ENCOUNTER (EMERGENCY)
Facility: HOSPITAL | Age: 38
Discharge: PSYCHIATRIC OR SUBSTANCE USE FACILITY | End: 2023-03-02
Attending: EMERGENCY MEDICINE
Payer: COMMERCIAL

## 2023-03-02 VITALS
HEIGHT: 67 IN | DIASTOLIC BLOOD PRESSURE: 79 MMHG | TEMPERATURE: 98 F | WEIGHT: 141.13 LBS | HEART RATE: 85 BPM | BODY MASS INDEX: 22.15 KG/M2 | RESPIRATION RATE: 19 BRPM | SYSTOLIC BLOOD PRESSURE: 113 MMHG | OXYGEN SATURATION: 98 %

## 2023-03-02 DIAGNOSIS — F29 PSYCHOSIS, UNSPECIFIED PSYCHOSIS TYPE: Primary | ICD-10-CM

## 2023-03-02 LAB
ALBUMIN SERPL BCP-MCNC: 4.2 G/DL (ref 3.5–5.2)
ALP SERPL-CCNC: 43 U/L (ref 55–135)
ALT SERPL W/O P-5'-P-CCNC: 20 U/L (ref 10–44)
AMPHET+METHAMPHET UR QL: ABNORMAL
ANION GAP SERPL CALC-SCNC: 17 MMOL/L (ref 8–16)
APAP SERPL-MCNC: <3 UG/ML (ref 10–20)
AST SERPL-CCNC: 35 U/L (ref 10–40)
B-HCG UR QL: NEGATIVE
BACTERIA #/AREA URNS AUTO: ABNORMAL /HPF
BARBITURATES UR QL SCN>200 NG/ML: NEGATIVE
BASOPHILS # BLD AUTO: 0.03 K/UL (ref 0–0.2)
BASOPHILS NFR BLD: 0.4 % (ref 0–1.9)
BENZODIAZ UR QL SCN>200 NG/ML: ABNORMAL
BILIRUB SERPL-MCNC: 0.3 MG/DL (ref 0.1–1)
BILIRUB UR QL STRIP: NEGATIVE
BUN SERPL-MCNC: 12 MG/DL (ref 6–20)
BZE UR QL SCN: NEGATIVE
CALCIUM SERPL-MCNC: 8.6 MG/DL (ref 8.7–10.5)
CANNABINOIDS UR QL SCN: NEGATIVE
CHLORIDE SERPL-SCNC: 111 MMOL/L (ref 95–110)
CLARITY UR REFRACT.AUTO: ABNORMAL
CO2 SERPL-SCNC: 13 MMOL/L (ref 23–29)
COLOR UR AUTO: YELLOW
CREAT SERPL-MCNC: 1.3 MG/DL (ref 0.5–1.4)
CREAT UR-MCNC: 161 MG/DL (ref 15–325)
CTP QC/QA: YES
DIFFERENTIAL METHOD: ABNORMAL
EOSINOPHIL # BLD AUTO: 0 K/UL (ref 0–0.5)
EOSINOPHIL NFR BLD: 0.3 % (ref 0–8)
ERYTHROCYTE [DISTWIDTH] IN BLOOD BY AUTOMATED COUNT: 11.2 % (ref 11.5–14.5)
EST. GFR  (NO RACE VARIABLE): 53.6 ML/MIN/1.73 M^2
ETHANOL SERPL-MCNC: 80 MG/DL
GLUCOSE SERPL-MCNC: 166 MG/DL (ref 70–110)
GLUCOSE UR QL STRIP: ABNORMAL
HCT VFR BLD AUTO: 36.3 % (ref 37–48.5)
HCV AB SERPL QL IA: NORMAL
HGB BLD-MCNC: 12.4 G/DL (ref 12–16)
HGB UR QL STRIP: ABNORMAL
HIV 1+2 AB+HIV1 P24 AG SERPL QL IA: NORMAL
HYALINE CASTS UR QL AUTO: 6 /LPF
IMM GRANULOCYTES # BLD AUTO: 0.04 K/UL (ref 0–0.04)
IMM GRANULOCYTES NFR BLD AUTO: 0.6 % (ref 0–0.5)
KETONES UR QL STRIP: ABNORMAL
LEUKOCYTE ESTERASE UR QL STRIP: NEGATIVE
LYMPHOCYTES # BLD AUTO: 1.1 K/UL (ref 1–4.8)
LYMPHOCYTES NFR BLD: 14.9 % (ref 18–48)
MCH RBC QN AUTO: 33.4 PG (ref 27–31)
MCHC RBC AUTO-ENTMCNC: 34.2 G/DL (ref 32–36)
MCV RBC AUTO: 98 FL (ref 82–98)
METHADONE UR QL SCN>300 NG/ML: NEGATIVE
MICROSCOPIC COMMENT: ABNORMAL
MONOCYTES # BLD AUTO: 0.3 K/UL (ref 0.3–1)
MONOCYTES NFR BLD: 4.5 % (ref 4–15)
NEUTROPHILS # BLD AUTO: 5.6 K/UL (ref 1.8–7.7)
NEUTROPHILS NFR BLD: 79.3 % (ref 38–73)
NITRITE UR QL STRIP: NEGATIVE
NRBC BLD-RTO: 0 /100 WBC
OPIATES UR QL SCN: NEGATIVE
PCP UR QL SCN>25 NG/ML: NEGATIVE
PH UR STRIP: 6 [PH] (ref 5–8)
PLATELET # BLD AUTO: 220 K/UL (ref 150–450)
PMV BLD AUTO: 10.4 FL (ref 9.2–12.9)
POTASSIUM SERPL-SCNC: 4.2 MMOL/L (ref 3.5–5.1)
PROT SERPL-MCNC: 7.2 G/DL (ref 6–8.4)
PROT UR QL STRIP: ABNORMAL
RBC # BLD AUTO: 3.71 M/UL (ref 4–5.4)
RBC #/AREA URNS AUTO: 2 /HPF (ref 0–4)
SARS-COV-2 RDRP RESP QL NAA+PROBE: NEGATIVE
SODIUM SERPL-SCNC: 141 MMOL/L (ref 136–145)
SP GR UR STRIP: 1.02 (ref 1–1.03)
SQUAMOUS #/AREA URNS AUTO: 5 /HPF
TOXICOLOGY INFORMATION: ABNORMAL
TSH SERPL DL<=0.005 MIU/L-ACNC: 0.44 UIU/ML (ref 0.4–4)
URN SPEC COLLECT METH UR: ABNORMAL
WBC # BLD AUTO: 7.1 K/UL (ref 3.9–12.7)
WBC #/AREA URNS AUTO: 5 /HPF (ref 0–5)

## 2023-03-02 PROCEDURE — 99285 EMERGENCY DEPT VISIT HI MDM: CPT | Mod: 25

## 2023-03-02 PROCEDURE — 86803 HEPATITIS C AB TEST: CPT | Performed by: PHYSICIAN ASSISTANT

## 2023-03-02 PROCEDURE — 87389 HIV-1 AG W/HIV-1&-2 AB AG IA: CPT | Performed by: PHYSICIAN ASSISTANT

## 2023-03-02 PROCEDURE — 99285 EMERGENCY DEPT VISIT HI MDM: CPT | Mod: CS,,, | Performed by: EMERGENCY MEDICINE

## 2023-03-02 PROCEDURE — 80307 DRUG TEST PRSMV CHEM ANLYZR: CPT | Performed by: EMERGENCY MEDICINE

## 2023-03-02 PROCEDURE — 85025 COMPLETE CBC W/AUTO DIFF WBC: CPT | Performed by: EMERGENCY MEDICINE

## 2023-03-02 PROCEDURE — 80143 DRUG ASSAY ACETAMINOPHEN: CPT | Performed by: EMERGENCY MEDICINE

## 2023-03-02 PROCEDURE — 82077 ASSAY SPEC XCP UR&BREATH IA: CPT | Performed by: EMERGENCY MEDICINE

## 2023-03-02 PROCEDURE — 99285 PR EMERGENCY DEPT VISIT,LEVEL V: ICD-10-PCS | Mod: CS,,, | Performed by: EMERGENCY MEDICINE

## 2023-03-02 PROCEDURE — 80053 COMPREHEN METABOLIC PANEL: CPT | Performed by: EMERGENCY MEDICINE

## 2023-03-02 PROCEDURE — 84443 ASSAY THYROID STIM HORMONE: CPT | Performed by: EMERGENCY MEDICINE

## 2023-03-02 PROCEDURE — 25000003 PHARM REV CODE 250: Performed by: EMERGENCY MEDICINE

## 2023-03-02 PROCEDURE — 81025 URINE PREGNANCY TEST: CPT | Performed by: EMERGENCY MEDICINE

## 2023-03-02 PROCEDURE — 81001 URINALYSIS AUTO W/SCOPE: CPT | Performed by: EMERGENCY MEDICINE

## 2023-03-02 PROCEDURE — U0002 COVID-19 LAB TEST NON-CDC: HCPCS | Performed by: EMERGENCY MEDICINE

## 2023-03-02 RX ORDER — IBUPROFEN 600 MG/1
600 TABLET ORAL
Status: COMPLETED | OUTPATIENT
Start: 2023-03-02 | End: 2023-03-02

## 2023-03-02 RX ORDER — LORAZEPAM 1 MG/1
1 TABLET ORAL
Status: COMPLETED | OUTPATIENT
Start: 2023-03-02 | End: 2023-03-02

## 2023-03-02 RX ADMIN — IBUPROFEN 600 MG: 600 TABLET, FILM COATED ORAL at 08:03

## 2023-03-02 RX ADMIN — LORAZEPAM 1 MG: 1 TABLET ORAL at 09:03

## 2023-03-02 NOTE — ED NOTES
Patient belongings placed in locked closet in labeled belongings bag include:    1 pair of black/grey/white sweat pants  1 black sweatshirt  2 black socks

## 2023-03-02 NOTE — ED NOTES
"Pt states "she has a date with [facility]" and cannot stay here. Pt requesting to speak to psychiatrist. Pt updated on [facility], planned transport to psych facility with psychiatrist there.  All questioned answered.   "

## 2023-03-02 NOTE — ED NOTES
Report received. Pt resting on stretcher with no distress noted. Pt remains in paper scrubs. Updated on planned transfer, verbalized understanding.

## 2023-03-02 NOTE — ED PROVIDER NOTES
Encounter Date: 3/2/2023       History     Chief Complaint   Patient presents with    Psychiatric Evaluation     Pt was running in the street hitting cars naked. Pt states that she is in purgatory and looking for her in [place]. EMS was called by [employer]. EMS gave pt 5mg versed IM.     HPI  38-year-old female with past medical history as noted below coming in brought in by patient is [employer] for running in the street naked hitting cars.  Patient states that she made a deal with the devil and is now and perk her story, also brings up something about air filters which is difficult to follow.  She denies any physical complaints including no headache, chest pain, shortness breath, abdominal pain, nausea, vomiting, diarrhea.  She denies psychiatric history.  She endorses taking Adderall in the morning but not taking any extra.  She denies any other drug use including amphetamines, heroin, cocaine, meth or any other drugs.    She denies suicidal ideation, homicidal ideation, visual auditory hallucinations.    Review of patient's allergies indicates:   Allergen Reactions    Tramadol Rash     Past Medical History:   Diagnosis Date    Abnormal Pap smear of cervix     ADHD (attention deficit hyperactivity disorder)     Diabetes mellitus, type 2     Insomnia     Peptic ulcer disease      Past Surgical History:   Procedure Laterality Date    BREAST SURGERY      Reduction    CERVICAL BIOPSY  W/ LOOP ELECTRODE EXCISION  2016    HAND SURGERY Left 10/23/2019    PERCUTANEOUS PINNING OF FINGER Left 10/23/2019    Procedure: PINNING, FINGER, PERCUTANEOUS;  Surgeon: Luis Angel Lynn MD;  Location: [facility];  Service: Orthopedics;  Laterality: Left;    WISDOM TOOTH EXTRACTION       Family History   Problem Relation Age of Onset    Fibroids Mother     Crohn's disease Sister     Fibroids Sister     Breast cancer Maternal Aunt     Cancer Maternal Aunt         Breast Cancer    Hypertension Father      Social History     Tobacco Use    Smoking status:  Former     Packs/day: 1.50     Years: 13.00     Pack years: 19.50     Types: Cigarettes, Vaping with nicotine     Quit date: 9/1/2012     Years since quitting: 10.5    Smokeless tobacco: Never   Substance Use Topics    Alcohol use: Yes     Alcohol/week: 6.0 standard drinks     Types: 6 Glasses of wine per week    Drug use: No     Review of Systems    Physical Exam     Initial Vitals [03/02/23 0230]   BP Pulse Resp Temp SpO2   103/60 108 (!) 25 99.5 °F (37.5 °C) 97 %      MAP       --         Physical Exam    Physical Exam:  CONSTITUTIONAL: Well developed, well nourished, in no acute distress.  HENT: Normocephalic, atraumatic    EYES: Sclerae anicteric, pupils equal round reactive, extraocular is are intact, no nystagmus.  NECK: Supple, no thyroid enlargement  CARDIOVASCULAR: Regular rate and rhythm without any murmurs, gallops, rubs.  RESPIRATORY: Speaking in full sentences. Breathing comfortably. Auscultation of the lungs revealed normal breath sounds b/l, no wheezing, no rales, no rhonchi.  ABDOMEN: Soft and nontender, no masses, no rebound or guarding   NEUROLOGIC: Alert, interacting normally. No facial droop.  5/5 strength bilaterally upper and lower extremities.  MSK: Moving all four extremities.  Skin: Warm and dry. No visible rash on exposed areas of skin.    Psych:  Hyper-Denominational, delusional, disorganized speech.      ED Course   Procedures  Labs Reviewed   CBC W/ AUTO DIFFERENTIAL - Abnormal; Notable for the following components:       Result Value    RBC 3.71 (*)     Hematocrit 36.3 (*)     MCH 33.4 (*)     RDW 11.2 (*)     Immature Granulocytes 0.6 (*)     Gran % 79.3 (*)     Lymph % 14.9 (*)     All other components within normal limits   COMPREHENSIVE METABOLIC PANEL - Abnormal; Notable for the following components:    Chloride 111 (*)     CO2 13 (*)     Glucose 166 (*)     Calcium 8.6 (*)     Alkaline Phosphatase 43 (*)     Anion Gap 17 (*)     eGFR 53.6 (*)     All other components within normal  limits   URINALYSIS, REFLEX TO URINE CULTURE - Abnormal; Notable for the following components:    Appearance, UA Hazy (*)     Protein, UA 1+ (*)     Glucose, UA Trace (*)     Ketones, UA 1+ (*)     Occult Blood UA Trace (*)     All other components within normal limits    Narrative:     Specimen Source->Urine   DRUG SCREEN PANEL, URINE EMERGENCY - Abnormal; Notable for the following components:    Benzodiazepines Presumptive Positive (*)     Amphetamine Screen, Ur Presumptive Positive (*)     All other components within normal limits    Narrative:     Specimen Source->Urine   ALCOHOL,MEDICAL (ETHANOL) - Abnormal; Notable for the following components:    Alcohol, Serum 80 (*)     All other components within normal limits   ACETAMINOPHEN LEVEL - Abnormal; Notable for the following components:    Acetaminophen (Tylenol), Serum <3.0 (*)     All other components within normal limits   URINALYSIS MICROSCOPIC - Abnormal; Notable for the following components:    Hyaline Casts, UA 6 (*)     All other components within normal limits    Narrative:     Specimen Source->Urine   TSH   SARS-COV-2 RNA AMPLIFICATION, QUAL   HIV 1 / 2 ANTIBODY    Narrative:     Release to patient->Immediate   HEPATITIS C ANTIBODY    Narrative:     Release to patient->Immediate   POCT URINE PREGNANCY          Imaging Results              CT Head Without Contrast (Final result)  Result time 03/02/23 05:45:02      Final result by Jarrod Del Rio MD (03/02/23 05:45:02)                   Impression:      No CT evidence of acute intracranial abnormality.  Further evaluation and follow-up as warranted.    Electronically signed by resident: Cj Daugherty  Date:    03/02/2023  Time:    05:18    Electronically signed by: Jarrod Del Rio MD  Date:    03/02/2023  Time:    05:45               Narrative:    EXAMINATION:  CT HEAD WITHOUT CONTRAST    CLINICAL HISTORY:  Delirium;    TECHNIQUE:  Low dose axial CT images obtained throughout the head without the  use of intravenous contrast.  Axial, sagittal and coronal reconstructions were performed.    COMPARISON:  MRI brain 06/27/2018, CT head 06/27/2018    FINDINGS:  Intracranial compartment:    Ventricles and sulci are normal in size for age without evidence of hydrocephalus.    The brain parenchyma appears within normal limits.  No parenchymal  hemorrhage, edema, mass effect or major vascular distribution infarct.    No extra-axial blood or fluid collections.    Skull/extracranial contents (limited evaluation):    No displaced calvarial fracture.    The mastoid air cells and visualized paranasal sinuses are essentially clear.                                       Medications - No data to display  Medical Decision Making:   History:   Old Medical Records: I decided to obtain old medical records.  Old Records Summarized: other records.       <> Summary of Records: Reviewed multiple previous pass office visits, no clear significant psychiatric history.  No clear previous significant substance abuse.  Clinical Tests:   Lab Tests: Ordered and Reviewed     38-year-old female with past medical history as noted below coming in running around naked on the street hitting cars, with police and with this MD she is delusional, stating that she made a deal with the devil, that she is on Procrit Patti, also having some kind of fixed delusion regarding air filters.      This poses a threat to her functioning.   She will need to be PEC'd for psychosis / grave disability.     Consistent with psychosis, likely psychiatric in nature.  Possibly precipitated by Adderall?  She seems to deny other substance use.  She has no physical complaints and her exam is benign.  There is no signs of trauma, she is moving all her extremities, she is speaking clearly, no cranial nerve deficits, no weakness, low suspicion for intracranial pathology but given no previous presentations for psychiatric illness age of 38, will obtain CT of the brain to look for  any intracranial abnormalities explaining her current delusions.    Screening labs look for any metabolic hematologic abnormalities, urine studies, drug screen, alcohol screen.    If ED workup is unremarkable anticipate medical clearance for inpatient psychiatric evaluation.    Update:  Labs are not remarkable, alcohol level is 80.  CT of the brain was undertaken and does not show any signs of dangerous intracranial pathology.  She remains hemodynamically stable.  At this time she is medically cleared, for inpatient psychiatric evaluation.             Medically cleared for psychiatry placement: 3/2/2023  6:13 AM         Clinical Impression:   Final diagnoses:  [F29] Psychosis, unspecified psychosis type (Primary)        ED Disposition Condition    Transfer to Psych Facility Stable          ED Prescriptions    None       Follow-up Information    None          Migue Harvey MD  03/02/23 0691

## 2023-03-02 NOTE — ED NOTES
Pt escorted off of the unit in a wheelchair by security and ED staff. Pt's PEC, transfer form, and belongings given to [facility] transportation staff. Pt remained calm and cooperative for the transfer.

## 2023-03-02 NOTE — ED TRIAGE NOTES
"Patient here via EMS. EMS states that they got a call that the patient was naked and slapping cars in a parking lot and bizarre behavior. Patient reports that she is in purgatory stating "I sold my soul to the Devil for my friends who are still in heaven." Patient denies any SI/HI at this time.  "

## (undated) DEVICE — SLING ARM MEDIUM FOAM STRAP

## (undated) DEVICE — GLOVE PI ULTRA TOUCH G SURGEON

## (undated) DEVICE — DRAPE STERI-DRAPE 1000 17X11IN

## (undated) DEVICE — SEE MEDLINE ITEM 152622

## (undated) DEVICE — GAUZE SPONGE 4X4 12PLY

## (undated) DEVICE — SEE MEDLINE ITEM 146268

## (undated) DEVICE — STOCKINET 4INX48

## (undated) DEVICE — UNDERGLOVES BIOGEL PI SIZE 8

## (undated) DEVICE — DRAPE MINI C ARM STERILE

## (undated) DEVICE — DRAPE PLASTIC U 60X72

## (undated) DEVICE — COVER CAMERA OPERATING ROOM

## (undated) DEVICE — ITEM DISCONTINUED

## (undated) DEVICE — TOURNIQUET SB QC DP 18X4IN

## (undated) DEVICE — DRESSING GAUZE 6PLY 4X4

## (undated) DEVICE — PAD CAST SPECIALIST STRL 4

## (undated) DEVICE — DRESSING XEROFORM 1X8IN

## (undated) DEVICE — SUT 4-0 ETHILON 18 PS-2

## (undated) DEVICE — Device

## (undated) DEVICE — SEE MEDLINE ITEM 157117

## (undated) DEVICE — CORD BIPOLAR 12 FOOT